# Patient Record
Sex: FEMALE | Race: WHITE | Employment: STUDENT | URBAN - METROPOLITAN AREA
[De-identification: names, ages, dates, MRNs, and addresses within clinical notes are randomized per-mention and may not be internally consistent; named-entity substitution may affect disease eponyms.]

---

## 2017-03-13 ENCOUNTER — HOSPITAL ENCOUNTER (OUTPATIENT)
Age: 22
Discharge: HOME | End: 2017-03-13
Attending: EMERGENCY MEDICINE
Payer: COMMERCIAL

## 2017-03-13 VITALS
TEMPERATURE: 98 F | WEIGHT: 145 LBS | DIASTOLIC BLOOD PRESSURE: 70 MMHG | SYSTOLIC BLOOD PRESSURE: 117 MMHG | OXYGEN SATURATION: 97 % | RESPIRATION RATE: 18 BRPM | HEART RATE: 81 BPM

## 2017-03-13 DIAGNOSIS — J02.9 ACUTE VIRAL PHARYNGITIS: ICD-10-CM

## 2017-03-13 DIAGNOSIS — J06.9 VIRAL UPPER RESPIRATORY INFECTION: Primary | ICD-10-CM

## 2017-03-13 LAB — S PYO AG THROAT QL: NEGATIVE

## 2017-03-13 PROCEDURE — 99212 OFFICE O/P EST SF 10 MIN: CPT

## 2017-03-13 PROCEDURE — 87430 STREP A AG IA: CPT

## 2017-03-13 PROCEDURE — 99213 OFFICE O/P EST LOW 20 MIN: CPT

## 2017-03-13 RX ORDER — FLUTICASONE PROPIONATE 50 MCG
1-2 SPRAY, SUSPENSION (ML) NASAL DAILY
Qty: 16 G | Refills: 0 | Status: SHIPPED | OUTPATIENT
Start: 2017-03-13 | End: 2017-04-12

## 2017-03-13 RX ORDER — DEXTROAMPHETAMINE SACCHARATE, AMPHETAMINE ASPARTATE, DEXTROAMPHETAMINE SULFATE AND AMPHETAMINE SULFATE 3.75; 3.75; 3.75; 3.75 MG/1; MG/1; MG/1; MG/1
TABLET ORAL 2 TIMES DAILY
COMMUNITY
End: 2018-02-06

## 2017-03-14 NOTE — ED PROVIDER NOTES
Patient Seen in: [facility] AND CLINICS Immediate Care In [address] [place] [address]    History   Patient presents with:  Sore Throat    Stated Complaint: sore throat     HPI  2 days ago started with a sore throat, sneezing, nasal congestion and feeling tired.   Patient is a fe Tympanic membrane and external ear normal.   Left Ear: Tympanic membrane and external ear normal.   Nose: Rhinorrhea present. Mouth/Throat: Uvula is midline and mucous membranes are normal. Posterior oropharyngeal erythema present.  No oropharyngeal exuda

## 2017-08-27 NOTE — ED INITIAL ASSESSMENT (HPI)
Patient c/o feeling anxious X2 weeks and becoming worse tonight with crying and sleeping more today.

## 2017-08-27 NOTE — ED NOTES
Pt sent from immediate care \"to talk to a Dr about her anxiety\". Pt does not appear anxious at this time, appears calm and has support person at the bedside.  Pt states she has been having increasing anxiety over the last couple weeks with difficulty slee

## 2017-08-27 NOTE — ED PROVIDER NOTES
Patient Seen in: [facility]    History   Patient presents with: Anxiety/Panic attack (neurologic)      HPI    Patient presents complaining of intermittent anxiety for the past 2 weeks. Worse today.   She states that she has h and vital signs reviewed. All other systems reviewed and negative except as noted above. PSFH elements reviewed from today and agreed except as otherwise stated in HPI.     Physical Exam     ED Triage Vitals [08/26/17 2146]  BP: 130/54  Pulse: 70  R presents with anxiety symptoms as well as depression. No self-harm thoughts or suicidal ideation. Patient in no distress in the ED, no physical complaints.   We will give the patient short prescription for Ativan for anxiety control, patient will follow-u

## 2017-09-07 ENCOUNTER — OFFICE VISIT (OUTPATIENT)
Dept: FAMILY MEDICINE CLINIC | Facility: CLINIC | Age: 22
End: 2017-09-07

## 2017-09-07 DIAGNOSIS — Z11.1 ENCOUNTER FOR TUBERCULIN SKIN TEST: Primary | ICD-10-CM

## 2017-09-07 PROCEDURE — 86580 TB INTRADERMAL TEST: CPT | Performed by: NURSE PRACTITIONER

## 2017-09-07 NOTE — PATIENT INSTRUCTIONS
You will need to return to clinic in 48-72 hours to have results of TB test read. Please return to clinic on on Sun 9/10/17 before 4:30pm to have your TB test read. If you do not return during this time your test will need to be repeated.      Our cl control program. Testing for TB is often part of routine prenatal care.   What other tests might I have along with this test?  If you test positive on a TB skin test, your healthcare provider will probably order a chest X-ray, sputum smear (a test on mucus may be slightly sore. You cannot get TB from the skin test.  What might affect my test results?   If you have been vaccinated against tuberculosis with BCG (Bacilli Calmette-Margarita), you can have a positive reaction to the skin test even if you don't have,

## 2017-09-07 NOTE — PROGRESS NOTES
Erendira Arredondo is a 25year old female who presents for TB testing. TUBERCULOSIS SCREENING QUESTIONNAIRE    · Live vaccines in the past month? no  · Any steroid medication in the past month? no  · History of BCG vaccine?    no  · If female, are yo

## 2017-09-10 ENCOUNTER — OFFICE VISIT (OUTPATIENT)
Dept: FAMILY MEDICINE CLINIC | Facility: CLINIC | Age: 22
End: 2017-09-10

## 2017-09-10 DIAGNOSIS — Z11.1 ENCOUNTER FOR PPD SKIN TEST READING: Primary | ICD-10-CM

## 2017-09-10 LAB — INDURATION (): 0 MM (ref 0–11)

## 2017-09-10 NOTE — PROGRESS NOTES
Patient here for TB skin test read. The results were negtaive, 0 mm, no induration. Date given: 9/7/17  Date read: 9/10/17  Time Read: 9:27 AM    Patient instructed to return as needed.

## 2017-11-22 ENCOUNTER — TELEPHONE (OUTPATIENT)
Dept: INTERNAL MEDICINE CLINIC | Facility: CLINIC | Age: 22
End: 2017-11-22

## 2017-11-22 PROBLEM — F41.9 ANXIETY: Status: ACTIVE | Noted: 2017-11-22

## 2017-11-22 PROBLEM — F90.1 ATTENTION DEFICIT HYPERACTIVITY DISORDER (ADHD), PREDOMINANTLY HYPERACTIVE TYPE: Status: ACTIVE | Noted: 2017-11-22

## 2017-11-23 NOTE — PROGRESS NOTES
HPI:    Patient ID: Stacey Matos is a 23year old female.     HPI she is here today to establish care  With new physician   she states that she has anxiety and adhd, she is on medication for 4 years and she was following with her pediatrician , and sh for Anxiety. Disp:  Rfl:    amphetamine-dextroamphetamine (ADDERALL) 15 MG Oral Tab Take by mouth 2 (two) times daily. Disp:  Rfl:    Norethindrone Acet-Ethinyl Est (MICROGESTIN 1/20 OR) Take by mouth.  Disp:  Rfl:      Allergies:No Known Allergies    HISTO rub.    No murmur heard. Pulmonary/Chest: Effort normal and breath sounds normal. No accessory muscle usage. No respiratory distress. She has no wheezes. She has no rales. She exhibits no tenderness. Abdominal: Soft.  Bowel sounds are normal. She exhibit

## 2017-11-23 NOTE — PATIENT INSTRUCTIONS
outine physical examination  (primary encounter diagnosis) request  Records  from previous PCP  Adhd/ anxiety- she is on medication for 4 years - will refer to [facility] , she will need psych  eval ,discussed with her in detail , if any suicidal thought ,

## 2017-12-06 ENCOUNTER — TELEPHONE (OUTPATIENT)
Dept: INTERNAL MEDICINE CLINIC | Facility: CLINIC | Age: 22
End: 2017-12-06

## 2017-12-06 NOTE — TELEPHONE ENCOUNTER
Pharmacy called asking if we had given pt a prescription for adderall, it seems the pt is telling the pharmacy that Dr. Eliecer Ash was going to Escribe it and that hard copies were no longer needed, please advise.

## 2017-12-06 NOTE — TELEPHONE ENCOUNTER
I did not , I discussed with her that she needs to see psych first  we can adjust meds , patient needs to call us

## 2017-12-06 NOTE — TELEPHONE ENCOUNTER
Spoke with pt and she states that she did call on 11/28 to schedule appt with psych and they took all her information and were supposed to call her back but she hasn't herd from them. Pt was advised to call them back and see what is the hold up.  Pt states

## 2017-12-07 NOTE — TELEPHONE ENCOUNTER
Checked with pt and the refill that she is looking for is Lorazepam. Per pharmacy, last refilled by Dr. Yu Ernst on 11/15/17 #30, 10/12/17 #30 and 9/10/17 #30.

## 2017-12-12 ENCOUNTER — TELEPHONE (OUTPATIENT)
Dept: INTERNAL MEDICINE CLINIC | Facility: CLINIC | Age: 22
End: 2017-12-12

## 2017-12-12 NOTE — TELEPHONE ENCOUNTER
Spoke to her , she states that her insurance is not covering to see psych thru [facility], they gave her different psyc, she was asling if is ok to see different one , I explained that if he is in her network is ok

## 2017-12-12 NOTE — TELEPHONE ENCOUNTER
I did talk to her , but she states her insurance doesn't cover her to see psych thru [facility] , they gave her different psychiatrist?

## 2018-01-22 ENCOUNTER — TELEPHONE (OUTPATIENT)
Dept: INTERNAL MEDICINE CLINIC | Facility: CLINIC | Age: 23
End: 2018-01-22

## 2018-01-22 NOTE — TELEPHONE ENCOUNTER
Patient calling states she called her physiatrist and will send her medication as he feels she needs.  HERB

## 2018-01-29 ENCOUNTER — TELEPHONE (OUTPATIENT)
Dept: INTERNAL MEDICINE CLINIC | Facility: CLINIC | Age: 23
End: 2018-01-29

## 2018-01-29 NOTE — TELEPHONE ENCOUNTER
Patient called, she has been waiting for a medication to treat her anxiety, her psychiatrist was to release information along with names of medications to you. Please call patient back and let her know.        Patient called back, she spoke to pyschiatrist

## 2018-01-30 NOTE — TELEPHONE ENCOUNTER
Patient called to follow up documents from psych. Spoke with Jose Moore from Dr. Irvin Gutierrez office [phone]. She will fax paper work to [place] office. Need to call back to confirm document has been received.

## 2018-01-31 NOTE — TELEPHONE ENCOUNTER
Follow up call placed to Dr. Caridad Bravo office regarding documents from psych. Informed office we have not received their fax but Dr. Adeel Vuong will speak directly to Dr. Ulysses Virk when she comes in. Per [facility] ok to do.     Call placed to patient, did not ans

## 2018-01-31 NOTE — TELEPHONE ENCOUNTER
Tried to call Dr. Marietta Silva office,  is seeing patients but gave direct extension to [place] office to ask what medication was recommended for patient during office visit.  Spoke with Juan José Swanson, requested for last progress notes faxed to [place] o

## 2018-02-01 NOTE — TELEPHONE ENCOUNTER
Spoke to he rpsychiatrist  Yesterday , he was recommending  congnitive behavior therpay , he thinks that is anxiety not  adhd, ihe thinks that we can consider strattera and not adderall, no lorazepam. Spoke with patient and explain she will call psych off

## 2018-02-05 ENCOUNTER — TELEPHONE (OUTPATIENT)
Dept: INTERNAL MEDICINE CLINIC | Facility: CLINIC | Age: 23
End: 2018-02-05

## 2018-02-05 NOTE — TELEPHONE ENCOUNTER
Patient calling to let you know her psychiatrist wants her to continue taking ADHD and anxiety medication wanting you to prescribe the medications if any questions please call Dr Joe Somers [phone]

## 2018-02-06 NOTE — TELEPHONE ENCOUNTER
Patient called back, informed script will be at [facility] office tomorrow. She is also aware she is to have random drug screening, patient verbalized understanding. Informed celexa was sent to pharmacy. Patient denies any questions.

## 2018-02-06 NOTE — TELEPHONE ENCOUNTER
Spoke with dr Silvia Mcfarlane about her medication , per him ok to continue with adderal  but she ill needs random drug screen , and also to start her on citalopram once a day , if not doing ok will need follow up

## 2018-04-14 ENCOUNTER — TELEPHONE (OUTPATIENT)
Dept: INTERNAL MEDICINE CLINIC | Facility: CLINIC | Age: 23
End: 2018-04-14

## 2018-04-16 RX ORDER — NORETHINDRONE ACETATE AND ETHINYL ESTRADIOL 1; .02 MG/1; MG/1
1 TABLET ORAL DAILY
Qty: 30 TABLET | Refills: 0 | Status: SHIPPED | OUTPATIENT
Start: 2018-04-16 | End: 2018-05-21

## 2018-04-30 RX ORDER — CITALOPRAM 10 MG/1
TABLET ORAL
Qty: 30 TABLET | Refills: 0 | Status: SHIPPED | OUTPATIENT
Start: 2018-04-30 | End: 2019-04-17

## 2018-05-21 NOTE — PROGRESS NOTES
HPI:    Patient ID: Mariama Diana is a 23year old female. HPI   Anxiety - she feels much better  Since she is on citalopram , she is not taking lorazepam anymore . She is not having panic attack as she used to have before. She is following with psyc Prescriptions:  Norethindrone Acet-Ethinyl Est (MICROGESTIN 1/20) 1-20 MG-MCG Oral Tab Take 1 tablet by mouth daily.  Disp: 1 Package Rfl: 3   CITALOPRAM HYDROBROMIDE 10 MG Oral Tab TAKE ONE TABLET BY MOUTH ONE TIME DAILY  Disp: 30 tablet Rfl: 0   amphetami eye exhibits no discharge. Left eye exhibits no discharge. No scleral icterus. Neck: Normal range of motion. Neck supple. No JVD present. No tracheal tenderness present. No tracheal deviation present. No thyroid mass and no thyromegaly present.    Cardiov Prescriptions Disp Refills    Norethindrone Acet-Ethinyl Est (MICROGESTIN 1/20) 1-20 MG-MCG Oral Tab 1 Package 3      Sig: Take 1 tablet by mouth daily.            Imaging & Referrals:  None        TI#[id number]

## 2018-05-21 NOTE — PATIENT INSTRUCTIONS
Anxiety  (primary encounter diagnosis)-stable advised to continue with current medication.     Attention deficit hyperactivity disorder (adhd), predominantly hyperactive type-continue with Adderall she is following with psychiatry    Irregular menstrual cyc

## 2018-07-23 ENCOUNTER — TELEPHONE (OUTPATIENT)
Dept: INTERNAL MEDICINE CLINIC | Facility: CLINIC | Age: 23
End: 2018-07-23

## 2018-07-23 NOTE — TELEPHONE ENCOUNTER
Has question about this medication     Current Outpatient Prescriptions:    Norethindrone Acet-Ethinyl Est (MICROGESTIN 1/20) 1-20 MG-MCG Oral Tab, Take 1 tablet by mouth daily. , Disp: 1 Package, Rfl: 3

## 2018-07-24 NOTE — TELEPHONE ENCOUNTER
Any type of physical or emotional stress can change cycles. Infection and heat dehydration exercise. Sometimes exercise can delay. And even stop it for a while.   Would monitor for the next cycle might might be a little bit delayed and then she go back o

## 2018-07-24 NOTE — TELEPHONE ENCOUNTER
Pt states she has been on this birth control for three years and a new pack she has opened and during the third week, pt got her period, pt wants to know if exercise--or new med bupropion-or having medication in extreme heat could cause cycle to come early

## 2018-08-15 ENCOUNTER — TELEPHONE (OUTPATIENT)
Dept: INTERNAL MEDICINE CLINIC | Facility: CLINIC | Age: 23
End: 2018-08-15

## 2018-08-15 NOTE — TELEPHONE ENCOUNTER
Current Outpatient Prescriptions:     Please call patient will  [address]     •  amphetamine-dextroamphetamine (ADDERALL) 15 MG Oral Tab, Take 1 tablet (15 mg total) by mouth 2 (two) times daily. , Disp: 60 tablet, Rfl: 0

## 2018-08-22 RX ORDER — NORETHINDRONE ACETATE AND ETHINYL ESTRADIOL 1; .02 MG/1; MG/1
1 TABLET ORAL DAILY
Qty: 1 PACKAGE | Refills: 3 | Status: SHIPPED | OUTPATIENT
Start: 2018-08-22 | End: 2018-11-20

## 2018-08-22 NOTE — TELEPHONE ENCOUNTER
Dr. Thomas Hoang, Norethindrone Acet-Ethinyl FAILED Gynecology protocol. UNABLE to fill please review. Thank you.     Gynecology Medications  Protocol Criteria:  · Appointment scheduled in the past 12 months or the next 3 months  · Pap smear in the past 12

## 2018-10-01 ENCOUNTER — HOSPITAL ENCOUNTER (EMERGENCY)
Facility: HOSPITAL | Age: 23
Discharge: HOME | End: 2018-10-01
Attending: EMERGENCY MEDICINE
Payer: COMMERCIAL

## 2018-10-01 VITALS
WEIGHT: 170 LBS | RESPIRATION RATE: 20 BRPM | SYSTOLIC BLOOD PRESSURE: 138 MMHG | HEART RATE: 98 BPM | OXYGEN SATURATION: 98 % | DIASTOLIC BLOOD PRESSURE: 62 MMHG | BODY MASS INDEX: 27 KG/M2 | TEMPERATURE: 98 F

## 2018-10-01 DIAGNOSIS — B34.9 VIRAL SYNDROME: Primary | ICD-10-CM

## 2018-10-01 PROCEDURE — 99283 EMERGENCY DEPT VISIT LOW MDM: CPT

## 2018-10-01 PROCEDURE — 87430 STREP A AG IA: CPT

## 2018-10-01 RX ORDER — CODEINE PHOSPHATE AND GUAIFENESIN 10; 100 MG/5ML; MG/5ML
5 SOLUTION ORAL EVERY 6 HOURS PRN
Qty: 118 ML | Refills: 0 | Status: SHIPPED | OUTPATIENT
Start: 2018-10-01 | End: 2018-11-15

## 2018-10-01 RX ORDER — BENZONATATE 100 MG/1
100 CAPSULE ORAL 3 TIMES DAILY PRN
Qty: 30 CAPSULE | Refills: 0 | Status: SHIPPED | OUTPATIENT
Start: 2018-10-01 | End: 2018-10-31

## 2018-10-01 NOTE — ED INITIAL ASSESSMENT (HPI)
Pt states she woke up with sore throat, coughing, and LADY. Pt states that the back of her throat is all white. Pt has non productive cough in triage and tachypnea.

## 2018-10-01 NOTE — ED NOTES
Pt alert and interactive. Complains of sore throat and SOB that started yesterday. Dry cough present. Also noted she had a fever yesterday and took aleve. Family members have been sick as well. Denies cp, dizziness, lightheadedness.  Family present at bedsi

## 2018-10-01 NOTE — ED PROVIDER NOTES
Patient Seen in: [facility] AND [facility] Emergency Department    History   No chief complaint on file. Stated Complaint: Cough; Sore Throat    HPI    28-year-old female with history of ADHD here with complaints of cough, sore throat, difficulty breathing clyde Temp src Oral   SpO2 98 %   O2 Device None (Room air)       Current:/62   Pulse 98   Temp 97.5 °F (36.4 °C) (Oral)   Resp 20   Wt 77.1 kg   SpO2 98%   BMI 27.44 kg/m²         Physical Exam   Constitutional: She is oriented to person, place, and clyde interpreted all the ED vitals  - afebrile, hemodynamically stable  - I ordered and personally reviewed the labs and imaging and found negative rapid strep  - supportive care discussed        Medical Record Review: I personally reviewed available prior medi mouth every 6 (six) hours as needed for cough. , Print Script, Disp-118 mL, R-0

## 2018-11-13 ENCOUNTER — HOSPITAL ENCOUNTER (OUTPATIENT)
Age: 23
Discharge: HOME | End: 2018-11-13
Attending: FAMILY MEDICINE
Payer: COMMERCIAL

## 2018-11-13 VITALS
RESPIRATION RATE: 20 BRPM | HEIGHT: 65 IN | HEART RATE: 94 BPM | WEIGHT: 170 LBS | OXYGEN SATURATION: 100 % | DIASTOLIC BLOOD PRESSURE: 66 MMHG | BODY MASS INDEX: 28.32 KG/M2 | TEMPERATURE: 99 F | SYSTOLIC BLOOD PRESSURE: 122 MMHG

## 2018-11-13 DIAGNOSIS — S61.211A LACERATION OF LEFT INDEX FINGER WITHOUT FOREIGN BODY WITHOUT DAMAGE TO NAIL, INITIAL ENCOUNTER: Primary | ICD-10-CM

## 2018-11-13 PROCEDURE — 90471 IMMUNIZATION ADMIN: CPT

## 2018-11-13 PROCEDURE — 99214 OFFICE O/P EST MOD 30 MIN: CPT

## 2018-11-13 PROCEDURE — 99213 OFFICE O/P EST LOW 20 MIN: CPT

## 2018-11-13 RX ORDER — CEPHALEXIN 500 MG/1
500 CAPSULE ORAL 2 TIMES DAILY
Qty: 10 CAPSULE | Refills: 0 | Status: SHIPPED | OUTPATIENT
Start: 2018-11-13 | End: 2018-11-18

## 2018-11-13 NOTE — ED NOTES
Pt discharged to care of self. Pt assessed by MD. All orders completed and acknowledged. Pt new medication and after care discussed, all questions anserwred. Pt confirmed understanding.

## 2018-11-13 NOTE — ED PROVIDER NOTES
Patient Seen in: [address]    History   Patient presents with:  Laceration Abrasion (integumentary)    Stated Complaint: LACERATION INDEX FINGER    HPI  27-year-old female presents to [address] [place] [address] with a laceration on the left in distal tip of index finger. Hemostatic. No foreign bodies or deep structures. ). She exhibits normal range of motion, no tenderness, no bony tenderness, normal capillary refill, no deformity and no swelling. Normal sensation noted. Normal strength noted.

## 2018-11-13 NOTE — ED INITIAL ASSESSMENT (HPI)
Pt states cutting finger on with exacto knife about 1 hour ago. Pt does not know if she had received tetanus shot in the last 5 years.

## 2018-11-15 ENCOUNTER — OFFICE VISIT (OUTPATIENT)
Dept: INTERNAL MEDICINE CLINIC | Facility: CLINIC | Age: 23
End: 2018-11-15
Payer: COMMERCIAL

## 2018-11-15 VITALS
SYSTOLIC BLOOD PRESSURE: 112 MMHG | BODY MASS INDEX: 29.84 KG/M2 | OXYGEN SATURATION: 98 % | HEIGHT: 64 IN | HEART RATE: 76 BPM | TEMPERATURE: 99 F | WEIGHT: 174.81 LBS | DIASTOLIC BLOOD PRESSURE: 56 MMHG

## 2018-11-15 DIAGNOSIS — S61.211A LACERATION OF LEFT INDEX FINGER WITHOUT FOREIGN BODY WITHOUT DAMAGE TO NAIL, INITIAL ENCOUNTER: Primary | ICD-10-CM

## 2018-11-15 DIAGNOSIS — F90.9 ATTENTION DEFICIT HYPERACTIVITY DISORDER (ADHD), UNSPECIFIED ADHD TYPE: ICD-10-CM

## 2018-11-15 DIAGNOSIS — Z28.21 REFUSED INFLUENZA VACCINE: ICD-10-CM

## 2018-11-15 PROCEDURE — 99213 OFFICE O/P EST LOW 20 MIN: CPT | Performed by: INTERNAL MEDICINE

## 2018-11-15 RX ORDER — BUPROPION HYDROCHLORIDE 100 MG/1
100 TABLET, EXTENDED RELEASE ORAL 2 TIMES DAILY
COMMUNITY
End: 2018-11-15

## 2018-11-15 RX ORDER — DEXTROAMPHETAMINE SACCHARATE, AMPHETAMINE ASPARTATE, DEXTROAMPHETAMINE SULFATE AND AMPHETAMINE SULFATE 3.75; 3.75; 3.75; 3.75 MG/1; MG/1; MG/1; MG/1
15 TABLET ORAL 2 TIMES DAILY
Qty: 60 TABLET | Refills: 0 | Status: SHIPPED | OUTPATIENT
Start: 2018-11-15 | End: 2019-07-01

## 2018-11-15 RX ORDER — BUPROPION HYDROCHLORIDE 200 MG/1
200 TABLET, EXTENDED RELEASE ORAL DAILY
COMMUNITY

## 2018-11-16 NOTE — PROGRESS NOTES
HPI:    Patient ID: Miguel Medina is a 26year old female. HPI    Patient comes in today for follow-up after she was seen at urgent care for cutting her index finger with with a knife.   She had a tetanus shot and was started on Keflex and told to f Cardiovascular: Normal rate and regular rhythm. Pulmonary/Chest: Effort normal and breath sounds normal.   Abdominal: Soft. Bowel sounds are normal.   Musculoskeletal: Normal range of motion.    Left index finger wound , no infection noted    Neurologic

## 2018-11-16 NOTE — PATIENT INSTRUCTIONS
ASSESSMENT/PLAN:   Laceration of left index finger without foreign body without damage to nail, initial encounter  (primary encounter diagnosis) no infection continue with antibiotic until done  Attention deficit hyperactivity disorder (adhd), unspecified

## 2018-11-20 ENCOUNTER — TELEPHONE (OUTPATIENT)
Dept: INTERNAL MEDICINE CLINIC | Facility: CLINIC | Age: 23
End: 2018-11-20

## 2018-11-20 RX ORDER — NORETHINDRONE ACETATE AND ETHINYL ESTRADIOL 1; .02 MG/1; MG/1
1 TABLET ORAL DAILY
Qty: 1 PACKAGE | Refills: 2 | Status: SHIPPED | OUTPATIENT
Start: 2018-11-20 | End: 2019-03-02

## 2018-11-20 NOTE — TELEPHONE ENCOUNTER
Current Outpatient Medications:   •  Norethindrone Acet-Ethinyl Est (MICROGESTIN 1/20) 1-20 MG-MCG Oral Tab, Take 1 tablet by mouth daily. , Disp: 1 Package, Rfl: 3

## 2018-11-21 NOTE — TELEPHONE ENCOUNTER
Spoke with patient she is still taking birth control   Refill sent to [address] [place] [address]. Per Dr. Yanet lyons

## 2019-03-04 RX ORDER — ESTAZOLAM 2 MG/1
TABLET ORAL
Qty: 21 TABLET | Refills: 1 | Status: SHIPPED | OUTPATIENT
Start: 2019-03-04 | End: 2019-05-01

## 2019-04-17 ENCOUNTER — HOSPITAL ENCOUNTER (OUTPATIENT)
Age: 24
Discharge: HOME | End: 2019-04-17
Attending: EMERGENCY MEDICINE
Payer: COMMERCIAL

## 2019-04-17 VITALS
TEMPERATURE: 98 F | BODY MASS INDEX: 26.66 KG/M2 | SYSTOLIC BLOOD PRESSURE: 120 MMHG | OXYGEN SATURATION: 99 % | HEIGHT: 65 IN | WEIGHT: 160 LBS | RESPIRATION RATE: 18 BRPM | HEART RATE: 85 BPM | DIASTOLIC BLOOD PRESSURE: 73 MMHG

## 2019-04-17 DIAGNOSIS — H81.10 BENIGN PAROXYSMAL POSITIONAL VERTIGO, UNSPECIFIED LATERALITY: Primary | ICD-10-CM

## 2019-04-17 PROCEDURE — 81025 URINE PREGNANCY TEST: CPT

## 2019-04-17 PROCEDURE — 81003 URINALYSIS AUTO W/O SCOPE: CPT

## 2019-04-17 PROCEDURE — 99214 OFFICE O/P EST MOD 30 MIN: CPT

## 2019-04-17 PROCEDURE — 99213 OFFICE O/P EST LOW 20 MIN: CPT

## 2019-04-17 RX ORDER — MECLIZINE HCL 12.5 MG/1
25 TABLET ORAL ONCE
Status: DISCONTINUED | OUTPATIENT
Start: 2019-04-17 | End: 2019-04-18

## 2019-04-17 RX ORDER — ONDANSETRON 4 MG/1
4 TABLET, ORALLY DISINTEGRATING ORAL EVERY 4 HOURS PRN
Qty: 10 TABLET | Refills: 0 | Status: SHIPPED | OUTPATIENT
Start: 2019-04-17 | End: 2019-04-24

## 2019-04-17 RX ORDER — ONDANSETRON 4 MG/1
4 TABLET, ORALLY DISINTEGRATING ORAL ONCE
Status: COMPLETED | OUTPATIENT
Start: 2019-04-17 | End: 2019-04-17

## 2019-04-17 RX ORDER — MECLIZINE HYDROCHLORIDE 25 MG/1
25 TABLET ORAL 3 TIMES DAILY PRN
Qty: 21 TABLET | Refills: 0 | Status: SHIPPED | OUTPATIENT
Start: 2019-04-17 | End: 2020-08-12

## 2019-04-18 NOTE — ED PROVIDER NOTES
Patient Seen in: [facility] CLIN[facility] Immediate Care In [address] [place] [address]    History   Patient presents with:  Dizziness (neurologic)    Stated Complaint: dizzy, nausea    HPI    Patient complains of dizziness that started 2 hours ago.  Patient states it feels like t 120/73   Pulse 85   Temp 98.4 °F (36.9 °C) (Oral)   Resp 18   Ht 165.1 cm (5' 5\")   Wt 72.6 kg   LMP 03/17/2019   SpO2 99%   BMI 26.63 kg/m²    PULSE OX   GENERAL: In mild distress  HEAD: normocephalic, atraumatic,   EYES: PERRLA, EOMI, conj sclera clear, times daily as needed.   Qty: 21 tablet Refills: 0                    Disposition and Plan     Clinical Impression:  Benign paroxysmal positional vertigo, unspecified laterality  (primary encounter diagnosis)    Disposition:  Discharge    Follow-up:  Evelyn Cook,

## 2019-05-01 RX ORDER — ESTAZOLAM 2 MG/1
TABLET ORAL
Qty: 63 TABLET | Refills: 1 | Status: SHIPPED | OUTPATIENT
Start: 2019-05-01 | End: 2019-10-12

## 2019-05-15 ENCOUNTER — TELEPHONE (OUTPATIENT)
Dept: INTERNAL MEDICINE CLINIC | Facility: CLINIC | Age: 24
End: 2019-05-15

## 2019-05-15 NOTE — TELEPHONE ENCOUNTER
She asked for the letter that states what is her diagnosis and waht treatment she is on . She needs that for her school.  She gives permission to put the diagnosis

## 2019-05-15 NOTE — TELEPHONE ENCOUNTER
Patient is in the office was told to come in at 6pm letter would be ready that she has ADD must be on letter head

## 2019-05-15 NOTE — TELEPHONE ENCOUNTER
Patient is requesting to speak to you she needs a  letter for her school on your letter head that you are the prescriber  of her medication and that she takes it for AD

## 2019-07-01 ENCOUNTER — TELEPHONE (OUTPATIENT)
Dept: INTERNAL MEDICINE CLINIC | Facility: CLINIC | Age: 24
End: 2019-07-01

## 2019-07-01 NOTE — TELEPHONE ENCOUNTER
Current Outpatient Medications:     •  amphetamine-dextroamphetamine (ADDERALL) 15 MG Oral Tab, Take 1 tablet (15 mg total) by mouth 2 (two) times daily. , Disp: 60 tablet, Rfl: 0 REFILL     Patient needs refill, please call patient when ready, she will p

## 2019-09-18 ENCOUNTER — TELEPHONE (OUTPATIENT)
Dept: INTERNAL MEDICINE CLINIC | Facility: CLINIC | Age: 24
End: 2019-09-18

## 2019-10-12 NOTE — TELEPHONE ENCOUNTER
NEEDS REFILL     Current Outpatient Medications:   •  •  MICROGESTIN 1/20 1-20 MG-MCG Oral Tab, TAKE ONE TABLET BY MOUTH ONE TIME DAILY, Disp: 63 tablet, Rfl: 1  •

## 2019-10-13 RX ORDER — NORETHINDRONE ACETATE AND ETHINYL ESTRADIOL 1; .02 MG/1; MG/1
1 TABLET ORAL
Qty: 28 TABLET | Refills: 0 | Status: SHIPPED | OUTPATIENT
Start: 2019-10-13 | End: 2019-11-23

## 2019-10-13 NOTE — TELEPHONE ENCOUNTER
Please review; protocol failed.     No pap on file  LOV-11/15/2018      Requested Prescriptions   Pending Prescriptions Disp Refills   • Norethindrone Acet-Ethinyl Est (MICROGESTIN 1/20) 1-20 MG-MCG Oral Tab 63 tablet 1     Sig: Take 1 tablet by mouth once

## 2019-11-08 RX ORDER — ESTAZOLAM 2 MG/1
TABLET ORAL
Qty: 21 TABLET | Refills: 0 | OUTPATIENT
Start: 2019-11-08

## 2019-11-09 ENCOUNTER — TELEPHONE (OUTPATIENT)
Dept: INTERNAL MEDICINE CLINIC | Facility: CLINIC | Age: 24
End: 2019-11-09

## 2019-11-09 NOTE — TELEPHONE ENCOUNTER
Stabbing pain L thigh. Now, in 24 hrs. To calf. Throbbing. On BCP. No travel Hx. No trauma. No CP and No SOB. Spoke with Dr. Jessica Chance in [address] [place] [address]. Message to PCP.

## 2019-11-13 ENCOUNTER — OFFICE VISIT (OUTPATIENT)
Dept: INTERNAL MEDICINE CLINIC | Facility: CLINIC | Age: 24
End: 2019-11-13
Payer: COMMERCIAL

## 2019-11-13 VITALS
TEMPERATURE: 98 F | SYSTOLIC BLOOD PRESSURE: 110 MMHG | DIASTOLIC BLOOD PRESSURE: 69 MMHG | BODY MASS INDEX: 26.66 KG/M2 | HEIGHT: 65 IN | HEART RATE: 88 BPM | WEIGHT: 160 LBS | RESPIRATION RATE: 18 BRPM

## 2019-11-13 DIAGNOSIS — Z01.419 ENCOUNTER FOR GYNECOLOGICAL EXAMINATION: Primary | ICD-10-CM

## 2019-11-13 PROCEDURE — 99213 OFFICE O/P EST LOW 20 MIN: CPT | Performed by: INTERNAL MEDICINE

## 2019-11-13 NOTE — PROGRESS NOTES
HPI:    Patient ID: Kait Javed is a 27year old female.     HPI She is here today for follow up   she is following with psychiatry  And she is currently on adderal and bupropion and is stable      She is refusing flu shot    She never had pap smear daily. 28 tablet 0   • amphetamine-dextroamphetamine (ADDERALL) 15 MG Oral Tab Take 1 tablet (15 mg total) by mouth 2 (two) times daily. 60 tablet 0   • Meclizine HCl 25 MG Oral Tab Take 1 tablet (25 mg total) by mouth 3 (three) times daily as needed.  21 t Normal rate, regular rhythm, normal heart sounds and intact distal pulses. Exam reveals no gallop and no friction rub. No murmur heard. Pulmonary/Chest: Effort normal and breath sounds normal. No accessory muscle usage. No respiratory distress.  She has

## 2019-11-25 RX ORDER — ESTAZOLAM 2 MG/1
TABLET ORAL
Qty: 21 TABLET | Refills: 0 | Status: SHIPPED | OUTPATIENT
Start: 2019-11-25 | End: 2019-11-27

## 2019-11-25 RX ORDER — ESTAZOLAM 2 MG/1
TABLET ORAL
Qty: 21 TABLET | Refills: 2 | OUTPATIENT
Start: 2019-11-25

## 2019-11-25 NOTE — TELEPHONE ENCOUNTER
Please review; protocol failed.     Please also advise if add'l refills appropriate    11/13/19 Dr. Nam Carry office visit notes     HPI:    Patient ID: Trever Elias is a 27year old female.     HPI She is here today for follow up   she is followi

## 2019-11-27 ENCOUNTER — TELEPHONE (OUTPATIENT)
Dept: INTERNAL MEDICINE CLINIC | Facility: CLINIC | Age: 24
End: 2019-11-27

## 2019-11-27 NOTE — TELEPHONE ENCOUNTER
Current Outpatient Medications:   •    MICROGESTIN 1/20 1-20 MG-MCG Oral Tab, Take 1 tablet by mouth once daily. , Disp: 21 tablet, Rfl: 0 REFILL      Please add refills to this rx, patient does not want to keep calling every month for this medication to

## 2019-11-27 NOTE — TELEPHONE ENCOUNTER
Current Outpatient Medications:       •  amphetamine-dextroamphetamine (ADDERALL) 15 MG Oral Tab, Take 1 tablet (15 mg total) by mouth 2 (two) times daily. , Disp: 60 tablet, Rfl: 0 REFILL       Please call patient when rx is ready to , she would l

## 2019-11-27 NOTE — TELEPHONE ENCOUNTER
I sent electronically to her pharmacy but please check with the patient if she got through her pharmacy otherwise she will need to come to the office to  prescription

## 2019-11-28 NOTE — TELEPHONE ENCOUNTER
Please see patient's message below and advise    Only one month Rx sent 11/25/19    Carolyn Lopez    11/27/19 11:56 AM   Note         Current Outpatient Medications:   •    MICROGESTIN 1/20 1-20 MG-MCG Oral Tab, Take 1 tablet by mouth once daily. , Disp: 2

## 2019-11-29 RX ORDER — NORETHINDRONE ACETATE AND ETHINYL ESTRADIOL 1; .02 MG/1; MG/1
1 TABLET ORAL
Qty: 63 TABLET | Refills: 0 | Status: SHIPPED | OUTPATIENT
Start: 2019-11-29 | End: 2020-02-07

## 2019-11-29 RX ORDER — ESTAZOLAM 2 MG/1
TABLET ORAL
Qty: 21 TABLET | Refills: 0 | OUTPATIENT
Start: 2019-11-29

## 2020-01-29 ENCOUNTER — HOSPITAL ENCOUNTER (OUTPATIENT)
Age: 25
Discharge: HOME | End: 2020-01-29
Attending: EMERGENCY MEDICINE
Payer: COMMERCIAL

## 2020-01-29 VITALS
WEIGHT: 165 LBS | HEART RATE: 92 BPM | RESPIRATION RATE: 19 BRPM | TEMPERATURE: 98 F | OXYGEN SATURATION: 98 % | SYSTOLIC BLOOD PRESSURE: 129 MMHG | BODY MASS INDEX: 28.17 KG/M2 | DIASTOLIC BLOOD PRESSURE: 65 MMHG | HEIGHT: 64 IN

## 2020-01-29 DIAGNOSIS — J01.90 ACUTE SINUSITIS, RECURRENCE NOT SPECIFIED, UNSPECIFIED LOCATION: Primary | ICD-10-CM

## 2020-01-29 PROCEDURE — 99213 OFFICE O/P EST LOW 20 MIN: CPT

## 2020-01-29 PROCEDURE — 99214 OFFICE O/P EST MOD 30 MIN: CPT

## 2020-01-29 RX ORDER — AMOXICILLIN 875 MG/1
875 TABLET, COATED ORAL 2 TIMES DAILY
Qty: 20 TABLET | Refills: 0 | Status: SHIPPED | OUTPATIENT
Start: 2020-01-29 | End: 2020-02-08

## 2020-01-30 NOTE — ED PROVIDER NOTES
Patient Seen in: [facility] CLINICS Immediate Care In [address] [place] [address]      History   Patient presents with:  Sore Throat    Stated Complaint: cough, fever, throat swelling    HPI    Patient is a 51-year-old female who is been ill for more than a week with URI sy and breath sounds normal. No respiratory distress. Abdominal: Soft. Bowel sounds are normal. Exhibits no distension and no mass. There is no tenderness. There is no rebound and no guarding. Musculoskeletal: Normal range of motion.  Exhibits no edema or

## 2020-02-07 RX ORDER — ESTAZOLAM 2 MG/1
TABLET ORAL
Qty: 63 TABLET | Refills: 1 | Status: SHIPPED | OUTPATIENT
Start: 2020-02-07 | End: 2020-07-02

## 2020-02-07 NOTE — TELEPHONE ENCOUNTER
Please review; protocol failed. Requested Prescriptions     Pending Prescriptions Disp Refills   • MICROGESTIN 1/20 1-20 MG-MCG Oral Tab [Pharmacy Med Name: Microgestin 1/20 1-20 Mg-Mcg Tab Mayn] 63 tablet 0     Sig: Take 1 tablet by mouth once daily.

## 2020-03-16 RX ORDER — DEXTROAMPHETAMINE SACCHARATE, AMPHETAMINE ASPARTATE, DEXTROAMPHETAMINE SULFATE AND AMPHETAMINE SULFATE 3.75; 3.75; 3.75; 3.75 MG/1; MG/1; MG/1; MG/1
15 TABLET ORAL 2 TIMES DAILY
Qty: 60 TABLET | Refills: 0 | Status: SHIPPED | OUTPATIENT
Start: 2020-03-16 | End: 2020-07-29

## 2020-03-16 NOTE — TELEPHONE ENCOUNTER
Patient called in requesting refill on medication below. She states that she has a few days of medication left. She states that she normally has to  the script.  She states that if it can be sent electronically that she confirmed the [facility] in [place]

## 2020-03-20 ENCOUNTER — NURSE TRIAGE (OUTPATIENT)
Dept: INTERNAL MEDICINE CLINIC | Facility: CLINIC | Age: 25
End: 2020-03-20

## 2020-03-20 ENCOUNTER — TELEPHONE (OUTPATIENT)
Dept: INTERNAL MEDICINE CLINIC | Facility: CLINIC | Age: 25
End: 2020-03-20

## 2020-03-20 NOTE — TELEPHONE ENCOUNTER
Action Requested: Summary for Provider     []  Critical Lab, Recommendations Needed  [x] Need Additional Advice  []   FYI    []   Need Orders  [] Need Medications Sent to Pharmacy  []  Other     SUMMARY: Pt with calf right pain, denies swelling, hot to dottie

## 2020-03-21 NOTE — TELEPHONE ENCOUNTER
I did talk to her she states that she feel like pulled muscle, spasm ,  no redness, no swelling .  She feels ok, advised her if any swelling , pain , redness go to er

## 2020-06-25 ENCOUNTER — TELEPHONE (OUTPATIENT)
Dept: INTERNAL MEDICINE CLINIC | Facility: CLINIC | Age: 25
End: 2020-06-25

## 2020-06-25 NOTE — TELEPHONE ENCOUNTER
Message # [id number] 2020 07:58p [[facility]]  To:  From: CANELO Monique MD:  Phone#:  ----------------------------------------------------------------------  Garcia Shankar [phone] RE; PT RASH ON INSIDE OF THIGH,   [birth year]  Paged at number : PA

## 2020-06-25 NOTE — TELEPHONE ENCOUNTER
She called me last night she did not notice a rash on her thigh.   I did advise her to put some topical corticosteroid cream if not better to follow-up

## 2020-07-02 RX ORDER — NORETHINDRONE ACETATE AND ETHINYL ESTRADIOL 1; .02 MG/1; MG/1
1 TABLET ORAL DAILY
Qty: 63 TABLET | Refills: 1 | Status: SHIPPED | OUTPATIENT
Start: 2020-07-02 | End: 2020-11-04

## 2020-07-02 NOTE — TELEPHONE ENCOUNTER
Patient called, requesting a refill for the following medication.     MICROGESTIN 1/20 1-20 MG-MCG Oral Tab

## 2020-07-02 NOTE — TELEPHONE ENCOUNTER
This is being sent to you without review by the Triage staff due to the high call volumes created by the COVID-19 virus, per the email sent by Dr. Kilo Brenner on 3/19/20. Thank you for your support.     Centralized Nurse Triage Team

## 2020-07-03 NOTE — TELEPHONE ENCOUNTER
Spoke with patient (name and  verified). Patient informed of message below. Patient states she had an appointment a few months ago. States she feels she doesn't have to come in for another  appointment.   Also stated she normally gets a 6 month refill

## 2020-07-29 RX ORDER — DEXTROAMPHETAMINE SACCHARATE, AMPHETAMINE ASPARTATE, DEXTROAMPHETAMINE SULFATE AND AMPHETAMINE SULFATE 3.75; 3.75; 3.75; 3.75 MG/1; MG/1; MG/1; MG/1
15 TABLET ORAL 2 TIMES DAILY
Qty: 60 TABLET | Refills: 0 | Status: SHIPPED | OUTPATIENT
Start: 2020-07-29 | End: 2020-10-13

## 2020-08-12 ENCOUNTER — OFFICE VISIT (OUTPATIENT)
Dept: INTERNAL MEDICINE CLINIC | Facility: CLINIC | Age: 25
End: 2020-08-12
Payer: COMMERCIAL

## 2020-08-12 VITALS
WEIGHT: 177 LBS | HEART RATE: 76 BPM | HEIGHT: 64.5 IN | BODY MASS INDEX: 29.85 KG/M2 | OXYGEN SATURATION: 98 % | SYSTOLIC BLOOD PRESSURE: 114 MMHG | DIASTOLIC BLOOD PRESSURE: 66 MMHG | TEMPERATURE: 98 F

## 2020-08-12 DIAGNOSIS — S41.152S DOG BITE OF ARM, LEFT, SEQUELA: Primary | ICD-10-CM
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
## 2020-08-12 PROCEDURE — 99213 OFFICE O/P EST LOW 20 MIN: CPT | Performed by: INTERNAL MEDICINE

## 2020-08-12 PROCEDURE — 3074F SYST BP LT 130 MM HG: CPT | Performed by: INTERNAL MEDICINE

## 2020-08-12 PROCEDURE — 3008F BODY MASS INDEX DOCD: CPT | Performed by: INTERNAL MEDICINE

## 2020-08-12 PROCEDURE — 3078F DIAST BP <80 MM HG: CPT | Performed by: INTERNAL MEDICINE

## 2020-08-12 RX ORDER — HYDROCODONE BITARTRATE AND ACETAMINOPHEN 5; 325 MG/1; MG/1
TABLET ORAL
COMMUNITY
Start: 2020-08-10 | End: 2020-08-28

## 2020-08-12 RX ORDER — BUPROPION HYDROCHLORIDE 300 MG/1
TABLET ORAL
COMMUNITY
Start: 2020-04-10 | End: 2020-08-12

## 2020-08-12 RX ORDER — AMOXICILLIN AND CLAVULANATE POTASSIUM 875; 125 MG/1; MG/1
TABLET, FILM COATED ORAL
COMMUNITY
Start: 2020-08-10 | End: 2020-08-28
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
## 2020-08-20 ENCOUNTER — OFFICE VISIT (OUTPATIENT)
Dept: INTERNAL MEDICINE CLINIC | Facility: CLINIC | Age: 25
End: 2020-08-20
Payer: COMMERCIAL

## 2020-08-20 VITALS
BODY MASS INDEX: 30 KG/M2 | TEMPERATURE: 100 F | SYSTOLIC BLOOD PRESSURE: 118 MMHG | OXYGEN SATURATION: 98 % | HEART RATE: 76 BPM | DIASTOLIC BLOOD PRESSURE: 67 MMHG | WEIGHT: 178 LBS

## 2020-08-20 DIAGNOSIS — S51.859S: Primary | ICD-10-CM
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
## 2020-08-20 PROCEDURE — 3078F DIAST BP <80 MM HG: CPT | Performed by: INTERNAL MEDICINE

## 2020-08-20 PROCEDURE — 3074F SYST BP LT 130 MM HG: CPT | Performed by: INTERNAL MEDICINE

## 2020-08-20 PROCEDURE — 99213 OFFICE O/P EST LOW 20 MIN: CPT | Performed by: INTERNAL MEDICINE

## 2020-08-20 NOTE — PROGRESS NOTES
HPI:    Patient ID: Mary Shipley is a 27year old female. HPI    she is  here today for follow-up on her stitches removal.  She states that her is better. She does have some tenderness on palpation but wound is healing nicely.     Review of S tablet 1   • BuPROPion HCl ER, SR, 200 MG Oral Tablet 12 Hr Take 200 mg by mouth daily.        • Amoxicillin-Pot Clavulanate 875-125 MG Oral Tab      • HYDROcodone-acetaminophen 5-325 MG Oral Tab        Allergies:No Known Allergies    HISTORY:  Past Medical normal and breath sounds normal. No accessory muscle usage. No respiratory distress. She has no wheezes. She has no rales. She exhibits no tenderness. Abdominal: Soft. Bowel sounds are normal. She exhibits no shifting dullness, no distension and no mass.

## 2020-08-28 ENCOUNTER — OFFICE VISIT (OUTPATIENT)
Dept: INTERNAL MEDICINE CLINIC | Facility: CLINIC | Age: 25
End: 2020-08-28
Payer: COMMERCIAL

## 2020-08-28 ENCOUNTER — MED REC SCAN ONLY (OUTPATIENT)
Dept: INTERNAL MEDICINE CLINIC | Facility: CLINIC | Age: 25
End: 2020-08-28

## 2020-08-28 VITALS
DIASTOLIC BLOOD PRESSURE: 64 MMHG | HEIGHT: 65 IN | OXYGEN SATURATION: 98 % | HEART RATE: 92 BPM | TEMPERATURE: 99 F | SYSTOLIC BLOOD PRESSURE: 105 MMHG | BODY MASS INDEX: 28.82 KG/M2 | WEIGHT: 173 LBS

## 2020-08-28 DIAGNOSIS — Z00.00 PHYSICAL EXAM, ANNUAL: Primary | ICD-10-CM

## 2020-08-28 PROCEDURE — 3074F SYST BP LT 130 MM HG: CPT | Performed by: INTERNAL MEDICINE

## 2020-08-28 PROCEDURE — 3078F DIAST BP <80 MM HG: CPT | Performed by: INTERNAL MEDICINE

## 2020-08-28 PROCEDURE — 99395 PREV VISIT EST AGE 18-39: CPT | Performed by: INTERNAL MEDICINE

## 2020-08-28 PROCEDURE — 3008F BODY MASS INDEX DOCD: CPT | Performed by: INTERNAL MEDICINE

## 2020-08-28 NOTE — PROGRESS NOTES
HPI:   Luis M Luis is a 27year old female who presents for a complete physical exam.   Her period is every other month.  She is using birth control    Wt Readings from Last 3 Encounters:  08/28/20 : 173 lb (78.5 kg)  08/20/20 : 178 lb (80.7 kg) Integumentary Negative Breast discharge, breast lump(s), hair loss and rash. MS Negative Back pain and joint pain. Hema/Lymph Negative Easy bleeding, easy bruising and thromboembolic events.    Allergic/Immuno Negative Environmental allergies, food al smear - refusing today   Add- she is following with psychiatry  Pt' s weight is Body mass index is 28.79 kg/m². , recommended low fat diet and aerobic exercise 30 minutes three times weekly.   The patient indicates understanding of these issues and agrees to

## 2020-10-13 ENCOUNTER — TELEPHONE (OUTPATIENT)
Dept: INTERNAL MEDICINE CLINIC | Facility: CLINIC | Age: 25
End: 2020-10-13

## 2020-10-13 NOTE — TELEPHONE ENCOUNTER
Patient needs a refill on      amphetamine-dextroamphetamine (ADDERALL) 15 MG Oral Tab 60 tablet 0 7/29/2020    Sig:   Take 1 tablet (15 mg total) by mouth 2 (two) times daily. Route:   Oral           Thank you.

## 2020-11-04 RX ORDER — ESTAZOLAM 2 MG/1
TABLET ORAL
Qty: 63 TABLET | Refills: 0 | Status: SHIPPED | OUTPATIENT
Start: 2020-11-04 | End: 2021-03-09

## 2020-11-17 RX ORDER — DEXTROAMPHETAMINE SACCHARATE, AMPHETAMINE ASPARTATE, DEXTROAMPHETAMINE SULFATE AND AMPHETAMINE SULFATE 3.75; 3.75; 3.75; 3.75 MG/1; MG/1; MG/1; MG/1
15 TABLET ORAL 2 TIMES DAILY
Qty: 60 TABLET | Refills: 0 | OUTPATIENT
Start: 2020-11-17 | End: 2020-12-17

## 2020-11-17 RX ORDER — DEXTROAMPHETAMINE SACCHARATE, AMPHETAMINE ASPARTATE, DEXTROAMPHETAMINE SULFATE AND AMPHETAMINE SULFATE 3.75; 3.75; 3.75; 3.75 MG/1; MG/1; MG/1; MG/1
15 TABLET ORAL 2 TIMES DAILY
Qty: 60 TABLET | Refills: 0 | OUTPATIENT
Start: 2020-12-18 | End: 2021-01-17

## 2020-11-17 RX ORDER — DEXTROAMPHETAMINE SACCHARATE, AMPHETAMINE ASPARTATE, DEXTROAMPHETAMINE SULFATE AND AMPHETAMINE SULFATE 3.75; 3.75; 3.75; 3.75 MG/1; MG/1; MG/1; MG/1
15 TABLET ORAL 2 TIMES DAILY
Qty: 60 TABLET | Refills: 0 | OUTPATIENT
Start: 2021-01-18 | End: 2021-02-17

## 2020-11-17 NOTE — TELEPHONE ENCOUNTER
Pt would like a refill on Rx amphetamine-dextroamphetamine 15MG twice daily. Pt requests 3 month supply. Pt will be out of medication today.   Please advise         Current Outpatient Medications     Dispense Refill      63 tablet 0   • amphetamine-dextroam

## 2020-12-22 RX ORDER — DEXTROAMPHETAMINE SACCHARATE, AMPHETAMINE ASPARTATE, DEXTROAMPHETAMINE SULFATE AND AMPHETAMINE SULFATE 3.75; 3.75; 3.75; 3.75 MG/1; MG/1; MG/1; MG/1
15 TABLET ORAL 2 TIMES DAILY
Qty: 60 TABLET | Refills: 0 | OUTPATIENT
Start: 2021-01-22 | End: 2021-02-21

## 2020-12-22 RX ORDER — DEXTROAMPHETAMINE SACCHARATE, AMPHETAMINE ASPARTATE, DEXTROAMPHETAMINE SULFATE AND AMPHETAMINE SULFATE 3.75; 3.75; 3.75; 3.75 MG/1; MG/1; MG/1; MG/1
15 TABLET ORAL 2 TIMES DAILY
Qty: 60 TABLET | Refills: 0 | Status: SHIPPED | OUTPATIENT
Start: 2020-12-22 | End: 2021-01-21

## 2020-12-22 RX ORDER — DEXTROAMPHETAMINE SACCHARATE, AMPHETAMINE ASPARTATE, DEXTROAMPHETAMINE SULFATE AND AMPHETAMINE SULFATE 3.75; 3.75; 3.75; 3.75 MG/1; MG/1; MG/1; MG/1
15 TABLET ORAL 2 TIMES DAILY
Qty: 60 TABLET | Refills: 0 | OUTPATIENT
Start: 2021-02-22 | End: 2021-03-24

## 2020-12-22 NOTE — TELEPHONE ENCOUNTER
Patient is requesting refill of medication amphetamine-dextroamphetamine (ADDERALL) 15 MG Oral Tab. Patient is requesting a 3 month supply of this medication.

## 2021-01-29 NOTE — TELEPHONE ENCOUNTER
Pt would like a refill on her adderall medication. Pt states that she was told by the pharmacy to contact us directly. Pt would like to know if the doctor can give her refills.   Ph[facility]acy: [facility] [place] (listed)     Current Outpatient Medications   M

## 2021-01-30 RX ORDER — DEXTROAMPHETAMINE SACCHARATE, AMPHETAMINE ASPARTATE, DEXTROAMPHETAMINE SULFATE AND AMPHETAMINE SULFATE 3.75; 3.75; 3.75; 3.75 MG/1; MG/1; MG/1; MG/1
15 TABLET ORAL 2 TIMES DAILY
Qty: 60 TABLET | Refills: 0 | Status: SHIPPED | OUTPATIENT
Start: 2021-01-30 | End: 2021-03-03

## 2021-03-03 RX ORDER — DEXTROAMPHETAMINE SACCHARATE, AMPHETAMINE ASPARTATE, DEXTROAMPHETAMINE SULFATE AND AMPHETAMINE SULFATE 3.75; 3.75; 3.75; 3.75 MG/1; MG/1; MG/1; MG/1
15 TABLET ORAL 2 TIMES DAILY
Qty: 60 TABLET | Refills: 0 | Status: SHIPPED | OUTPATIENT
Start: 2021-03-03 | End: 2021-04-06

## 2021-03-09 RX ORDER — ESTAZOLAM 2 MG/1
TABLET ORAL
Qty: 63 TABLET | Refills: 0 | Status: SHIPPED | OUTPATIENT
Start: 2021-03-09 | End: 2021-05-03

## 2021-04-06 RX ORDER — DEXTROAMPHETAMINE SACCHARATE, AMPHETAMINE ASPARTATE, DEXTROAMPHETAMINE SULFATE AND AMPHETAMINE SULFATE 3.75; 3.75; 3.75; 3.75 MG/1; MG/1; MG/1; MG/1
15 TABLET ORAL 2 TIMES DAILY
Qty: 60 TABLET | Refills: 0 | Status: SHIPPED | OUTPATIENT
Start: 2021-04-06 | End: 2021-05-12

## 2021-05-03 RX ORDER — ESTAZOLAM 2 MG/1
TABLET ORAL
Qty: 63 TABLET | Refills: 0 | Status: SHIPPED | OUTPATIENT
Start: 2021-05-03 | End: 2021-05-19

## 2021-05-10 NOTE — TELEPHONE ENCOUNTER
Patient requesting refill on medication     amphetamine-dextroamphetamine (ADDERALL) 15 MG Oral Tab 60 tablet 0 4/6/2021

## 2021-05-11 RX ORDER — DEXTROAMPHETAMINE SACCHARATE, AMPHETAMINE ASPARTATE, DEXTROAMPHETAMINE SULFATE AND AMPHETAMINE SULFATE 3.75; 3.75; 3.75; 3.75 MG/1; MG/1; MG/1; MG/1
15 TABLET ORAL 2 TIMES DAILY
Qty: 60 TABLET | Refills: 0 | OUTPATIENT
Start: 2021-05-11

## 2021-05-12 RX ORDER — DEXTROAMPHETAMINE SACCHARATE, AMPHETAMINE ASPARTATE, DEXTROAMPHETAMINE SULFATE AND AMPHETAMINE SULFATE 3.75; 3.75; 3.75; 3.75 MG/1; MG/1; MG/1; MG/1
15 TABLET ORAL 2 TIMES DAILY
Qty: 60 TABLET | Refills: 0 | Status: SHIPPED | OUTPATIENT
Start: 2021-05-12 | End: 2021-05-19

## 2021-05-19 NOTE — PROGRESS NOTES
HPI/Subjective:     Patient ID: Jonny Rodrigues is a 23year old female. She is here today for follow up and refill on her medication    She is following with psychiatry . She needs refill on her adderall.   She is doing okay    She also states t agitation, behavioral problems, confusion, hallucinations and sleep disturbance. The patient is not nervous/anxious.       Current Outpatient Medications   Medication Sig Dispense Refill   • Norethindrone Acet-Ethinyl Est (MICROGESTIN 1/20) 1-20 MG-MCG Oral Trachea: No tracheal tenderness or tracheal deviation. Cardiovascular:      Rate and Rhythm: Normal rate and regular rhythm. Heart sounds: Normal heart sounds. No murmur heard. No friction rub. No gallop.     Pulmonary:      Effort: Pulmonary effor tablet 0     Sig: Take 1 tablet (15 mg total) by mouth 2 (two) times daily.        Imaging & Referrals:  [facility] - INTERNAL

## 2021-07-05 RX ORDER — DEXTROAMPHETAMINE SACCHARATE, AMPHETAMINE ASPARTATE, DEXTROAMPHETAMINE SULFATE AND AMPHETAMINE SULFATE 3.75; 3.75; 3.75; 3.75 MG/1; MG/1; MG/1; MG/1
TABLET ORAL
Qty: 60 TABLET | Refills: 0 | Status: SHIPPED | OUTPATIENT
Start: 2021-07-05 | End: 2021-08-31

## 2021-08-27 RX ORDER — DEXTROAMPHETAMINE SACCHARATE, AMPHETAMINE ASPARTATE, DEXTROAMPHETAMINE SULFATE AND AMPHETAMINE SULFATE 3.75; 3.75; 3.75; 3.75 MG/1; MG/1; MG/1; MG/1
15 TABLET ORAL DAILY
Qty: 30 TABLET | Refills: 0 | Status: SHIPPED | OUTPATIENT
Start: 2021-08-27 | End: 2021-08-30

## 2021-08-27 RX ORDER — DEXTROAMPHETAMINE SACCHARATE, AMPHETAMINE ASPARTATE, DEXTROAMPHETAMINE SULFATE AND AMPHETAMINE SULFATE 3.75; 3.75; 3.75; 3.75 MG/1; MG/1; MG/1; MG/1
15 TABLET ORAL DAILY
Qty: 30 TABLET | Refills: 0 | OUTPATIENT
Start: 2021-08-27 | End: 2021-09-26

## 2021-08-27 RX ORDER — DEXTROAMPHETAMINE SACCHARATE, AMPHETAMINE ASPARTATE, DEXTROAMPHETAMINE SULFATE AND AMPHETAMINE SULFATE 3.75; 3.75; 3.75; 3.75 MG/1; MG/1; MG/1; MG/1
15 TABLET ORAL DAILY
Qty: 30 TABLET | Refills: 0 | OUTPATIENT
Start: 2021-09-27 | End: 2021-10-27

## 2021-08-27 RX ORDER — DEXTROAMPHETAMINE SACCHARATE, AMPHETAMINE ASPARTATE, DEXTROAMPHETAMINE SULFATE AND AMPHETAMINE SULFATE 3.75; 3.75; 3.75; 3.75 MG/1; MG/1; MG/1; MG/1
15 TABLET ORAL DAILY
Qty: 30 TABLET | Refills: 0 | OUTPATIENT
Start: 2021-10-28 | End: 2021-11-27

## 2021-08-27 NOTE — TELEPHONE ENCOUNTER
Patient contacted [facility] and was told to contact our office for rx aterall. Patient also asking if she can be approved for 3 month supply, please call at [phone],[id number].   *request to send as high priority only has 2 days left

## 2021-08-28 RX ORDER — NORETHINDRONE ACETATE/ETHINYL ESTRADIOL 1MG-20MCG
KIT ORAL
Qty: 63 TABLET | Refills: 0 | Status: SHIPPED | OUTPATIENT
Start: 2021-08-28 | End: 2021-11-05

## 2021-08-30 ENCOUNTER — TELEPHONE (OUTPATIENT)
Dept: INTERNAL MEDICINE CLINIC | Facility: CLINIC | Age: 26
End: 2021-08-30

## 2021-08-30 RX ORDER — DEXTROAMPHETAMINE SACCHARATE, AMPHETAMINE ASPARTATE, DEXTROAMPHETAMINE SULFATE AND AMPHETAMINE SULFATE 3.75; 3.75; 3.75; 3.75 MG/1; MG/1; MG/1; MG/1
15 TABLET ORAL DAILY
Qty: 30 TABLET | Refills: 0 | Status: SHIPPED | OUTPATIENT
Start: 2021-08-30 | End: 2021-08-31 | Stop reason: CLARIF

## 2021-08-30 NOTE — TELEPHONE ENCOUNTER
Lottie/ Pharmacy Technician of [address] [place] [address] is calling to follow up to confirm directions for patient's medication amphetamine-dextroamphetamine (ADDERALL) 15 MG Oral Tab. Please advise.

## 2021-08-30 NOTE — TELEPHONE ENCOUNTER
Script sent in for only taking once daily; When last prescribed it was for 2 times daily. Left message for patient to call back with clarification.

## 2021-08-31 RX ORDER — DEXTROAMPHETAMINE SACCHARATE, AMPHETAMINE ASPARTATE, DEXTROAMPHETAMINE SULFATE AND AMPHETAMINE SULFATE 3.75; 3.75; 3.75; 3.75 MG/1; MG/1; MG/1; MG/1
15 TABLET ORAL 2 TIMES DAILY
Qty: 60 TABLET | Refills: 0 | OUTPATIENT
Start: 2021-08-31 | End: 2021-09-30

## 2021-08-31 RX ORDER — DEXTROAMPHETAMINE SACCHARATE, AMPHETAMINE ASPARTATE, DEXTROAMPHETAMINE SULFATE AND AMPHETAMINE SULFATE 3.75; 3.75; 3.75; 3.75 MG/1; MG/1; MG/1; MG/1
15 TABLET ORAL 2 TIMES DAILY
Qty: 60 TABLET | Refills: 0 | OUTPATIENT
Start: 2021-11-01 | End: 2021-12-01

## 2021-08-31 RX ORDER — DEXTROAMPHETAMINE SACCHARATE, AMPHETAMINE ASPARTATE, DEXTROAMPHETAMINE SULFATE AND AMPHETAMINE SULFATE 3.75; 3.75; 3.75; 3.75 MG/1; MG/1; MG/1; MG/1
15 TABLET ORAL 2 TIMES DAILY
Qty: 60 TABLET | Refills: 0 | OUTPATIENT
Start: 2021-10-01 | End: 2021-10-31

## 2021-08-31 NOTE — TELEPHONE ENCOUNTER
The patient calling to stated the Adderall 15 mg is to be for BID. It was sent in for only once day.     I pended the 90 day panel for BID

## 2021-10-01 RX ORDER — DEXTROAMPHETAMINE SACCHARATE, AMPHETAMINE ASPARTATE, DEXTROAMPHETAMINE SULFATE AND AMPHETAMINE SULFATE 3.75; 3.75; 3.75; 3.75 MG/1; MG/1; MG/1; MG/1
1 TABLET ORAL 2 TIMES DAILY
Qty: 60 TABLET | Refills: 0 | Status: SHIPPED | OUTPATIENT
Start: 2021-10-01 | End: 2021-11-01

## 2021-10-01 NOTE — TELEPHONE ENCOUNTER
amphetamine-dextroamphetamine 15 MG Oral Tab    Pt requesting refill has one day left  Please advise

## 2021-11-01 RX ORDER — DEXTROAMPHETAMINE SACCHARATE, AMPHETAMINE ASPARTATE, DEXTROAMPHETAMINE SULFATE AND AMPHETAMINE SULFATE 3.75; 3.75; 3.75; 3.75 MG/1; MG/1; MG/1; MG/1
1 TABLET ORAL 2 TIMES DAILY
Qty: 60 TABLET | Refills: 0 | Status: SHIPPED | OUTPATIENT
Start: 2021-11-01 | End: 2021-12-01

## 2021-11-01 NOTE — TELEPHONE ENCOUNTER
Pt would like a refill on her amphetamine-dextroamphetamine medication. Pt states that she is out of medication. Pharmacy: [facility] (listed) pt would like to confirm that the medication will be sent for twice a day.      Current Outpatient Medic

## 2021-11-01 NOTE — TELEPHONE ENCOUNTER
Please review; protocol failed/no protocol    Requested Prescriptions   Pending Prescriptions Disp Refills    amphetamine-dextroamphetamine 15 MG Oral Tab 60 tablet 0     Sig: Take 1 tablet (15 mg total) by mouth 2 (two) times daily.         There is no ref

## 2021-11-05 RX ORDER — NORETHINDRONE ACETATE AND ETHINYL ESTRADIOL 1; .02 MG/1; MG/1
1 TABLET ORAL DAILY
Qty: 63 TABLET | Refills: 0 | Status: SHIPPED | OUTPATIENT
Start: 2021-11-05

## 2021-11-05 NOTE — TELEPHONE ENCOUNTER
Please review; protocol failed. No PAP smear on file.     Requested Prescriptions   Pending Prescriptions Disp Refills    LEOPOLDO 1/20 1-20 MG-MCG Oral Tab [Pharmacy Med Name: Omayravishal Del Cidens 1/20 1-20 Mg-Mcg Tab Nort] 63 tablet 0     Sig: TAKE 1 TABLET BY MOUTH RADHA

## 2021-12-01 RX ORDER — DEXTROAMPHETAMINE SACCHARATE, AMPHETAMINE ASPARTATE, DEXTROAMPHETAMINE SULFATE AND AMPHETAMINE SULFATE 3.75; 3.75; 3.75; 3.75 MG/1; MG/1; MG/1; MG/1
1 TABLET ORAL 2 TIMES DAILY
Qty: 60 TABLET | Refills: 0 | Status: SHIPPED | OUTPATIENT
Start: 2021-12-01 | End: 2021-12-29

## 2021-12-28 NOTE — TELEPHONE ENCOUNTER
Per patient she needs refill on her Adderral.    Current Outpatient Medications   Medication Sig Dispense Refill   • amphetamine-dextroamphetamine 15 MG Oral Tab Take 1 tablet (15 mg total) by mouth 2 (two) times daily.  60 tablet 0   •       •

## 2021-12-29 RX ORDER — DEXTROAMPHETAMINE SACCHARATE, AMPHETAMINE ASPARTATE, DEXTROAMPHETAMINE SULFATE AND AMPHETAMINE SULFATE 3.75; 3.75; 3.75; 3.75 MG/1; MG/1; MG/1; MG/1
1 TABLET ORAL 2 TIMES DAILY
Qty: 60 TABLET | Refills: 0 | Status: SHIPPED | OUTPATIENT
Start: 2021-12-29 | End: 2022-01-22

## 2022-01-21 NOTE — TELEPHONE ENCOUNTER
Patient is requesting refill for adderall and states she only has one or two days left of medication.

## 2022-01-22 RX ORDER — DEXTROAMPHETAMINE SACCHARATE, AMPHETAMINE ASPARTATE, DEXTROAMPHETAMINE SULFATE AND AMPHETAMINE SULFATE 3.75; 3.75; 3.75; 3.75 MG/1; MG/1; MG/1; MG/1
1 TABLET ORAL 2 TIMES DAILY
Qty: 60 TABLET | Refills: 0 | Status: SHIPPED | OUTPATIENT
Start: 2022-01-22

## 2022-02-21 ENCOUNTER — OFFICE VISIT (OUTPATIENT)
Dept: DERMATOLOGY CLINIC | Facility: CLINIC | Age: 27
End: 2022-02-21
Payer: COMMERCIAL

## 2022-02-21 DIAGNOSIS — L85.8 KERATOSIS PILARIS: Primary | ICD-10-CM

## 2022-02-21 PROCEDURE — 99203 OFFICE O/P NEW LOW 30 MIN: CPT | Performed by: PHYSICIAN ASSISTANT

## 2022-02-21 RX ORDER — CLINDAMYCIN PHOSPHATE 10 UG/ML
1 LOTION TOPICAL DAILY
Qty: 60 ML | Refills: 2 | Status: SHIPPED | OUTPATIENT
Start: 2022-02-21

## 2022-02-25 RX ORDER — DEXTROAMPHETAMINE SACCHARATE, AMPHETAMINE ASPARTATE, DEXTROAMPHETAMINE SULFATE AND AMPHETAMINE SULFATE 3.75; 3.75; 3.75; 3.75 MG/1; MG/1; MG/1; MG/1
1 TABLET ORAL 2 TIMES DAILY
Qty: 60 TABLET | Refills: 0 | Status: SHIPPED | OUTPATIENT
Start: 2022-02-25

## 2022-02-25 NOTE — TELEPHONE ENCOUNTER
Protocol failed or has No Protocol, please review  Requested Prescriptions   Pending Prescriptions Disp Refills    amphetamine-dextroamphetamine 15 MG Oral Tab 60 tablet 0     Sig: Take 1 tablet (15 mg total) by mouth 2 (two) times daily.         There is no refill protocol information for this order           Recent Outpatient Visits              4 days ago Keratosis pilaris    [facility] Ion Helton PA-C    Office Visit    9 months ago Attention deficit hyperactivity disorder (ADHD), other type    Mitch Bruno MD    Office Visit    1 year ago Physical exam, annual    Laura Betts MD    Office Visit    1 year ago Dog bite of forearm without complication, sequela    Luara Betts MD    Office Visit    1 year ago Dog bite of arm, left, sequela    Pedro Luis Ryder, Emerson Grier MD    Office Visit

## 2022-04-05 RX ORDER — DEXTROAMPHETAMINE SACCHARATE, AMPHETAMINE ASPARTATE, DEXTROAMPHETAMINE SULFATE AND AMPHETAMINE SULFATE 3.75; 3.75; 3.75; 3.75 MG/1; MG/1; MG/1; MG/1
15 TABLET ORAL 2 TIMES DAILY
Qty: 60 TABLET | Refills: 0 | Status: SHIPPED | OUTPATIENT
Start: 2022-04-05 | End: 2022-05-05

## 2022-04-05 RX ORDER — DEXTROAMPHETAMINE SACCHARATE, AMPHETAMINE ASPARTATE, DEXTROAMPHETAMINE SULFATE AND AMPHETAMINE SULFATE 3.75; 3.75; 3.75; 3.75 MG/1; MG/1; MG/1; MG/1
15 TABLET ORAL 2 TIMES DAILY
Qty: 60 TABLET | Refills: 0 | OUTPATIENT
Start: 2022-06-06 | End: 2022-07-06

## 2022-04-05 RX ORDER — DEXTROAMPHETAMINE SACCHARATE, AMPHETAMINE ASPARTATE, DEXTROAMPHETAMINE SULFATE AND AMPHETAMINE SULFATE 3.75; 3.75; 3.75; 3.75 MG/1; MG/1; MG/1; MG/1
15 TABLET ORAL 2 TIMES DAILY
Qty: 60 TABLET | Refills: 0 | OUTPATIENT
Start: 2022-05-06 | End: 2022-06-05

## 2022-04-05 NOTE — TELEPHONE ENCOUNTER
Please review refill failed/no protocol - Last office visit 5/19/21    Requested Prescriptions     Pending Prescriptions Disp Refills    amphetamine-dextroamphetamine (ADDERALL) 15 MG Oral Tab 60 tablet 0     Sig: Take 1 tablet (15 mg total) by mouth 2 (two) times daily. amphetamine-dextroamphetamine (ADDERALL) 15 MG Oral Tab 60 tablet 0     Sig: Take 1 tablet (15 mg total) by mouth 2 (two) times daily. amphetamine-dextroamphetamine (ADDERALL) 15 MG Oral Tab 60 tablet 0     Sig: Take 1 tablet (15 mg total) by mouth 2 (two) times daily. Recent Visits  Date Type Provider Dept   05/19/21 Office Visit Ankur Hansen MD [id number]   Showing recent visits within past 540 days with a meds authorizing provider and meeting all other requirements  Future Appointments  No visits were found meeting these conditions. Showing future appointments within next 150 days with a meds authorizing provider and meeting all other requirements    Requested Prescriptions   Pending Prescriptions Disp Refills    amphetamine-dextroamphetamine (ADDERALL) 15 MG Oral Tab 60 tablet 0     Sig: Take 1 tablet (15 mg total) by mouth 2 (two) times daily. There is no refill protocol information for this order        amphetamine-dextroamphetamine (ADDERALL) 15 MG Oral Tab 60 tablet 0     Sig: Take 1 tablet (15 mg total) by mouth 2 (two) times daily. There is no refill protocol information for this order        amphetamine-dextroamphetamine (ADDERALL) 15 MG Oral Tab 60 tablet 0     Sig: Take 1 tablet (15 mg total) by mouth 2 (two) times daily.         There is no refill protocol information for this order           Recent Outpatient Visits              1 month ago Keratosis pilaris    [facility] Grady Colon PA-C    Office Visit    10 months ago Attention deficit hyperactivity disorder (ADHD), other type    Marisol Carrillo MD    Office Visit    1 year ago Physical exam, annual    Diane Ken MD    Office Visit    1 year ago Dog bite of forearm without complication, sequela    Diane Ken MD    Office Visit    1 year ago Dog bite of arm, left, sequela    Regan Mir, Esteban Contreras MD    Office Visit

## 2022-04-05 NOTE — TELEPHONE ENCOUNTER
Please refill patient's Adderall 15mg two times daily, send to [address] [facility]

## 2022-06-01 ENCOUNTER — OFFICE VISIT (OUTPATIENT)
Dept: INTERNAL MEDICINE CLINIC | Facility: CLINIC | Age: 27
End: 2022-06-01
Payer: COMMERCIAL

## 2022-06-01 ENCOUNTER — MED REC SCAN ONLY (OUTPATIENT)
Dept: INTERNAL MEDICINE CLINIC | Facility: CLINIC | Age: 27
End: 2022-06-01

## 2022-06-01 VITALS
DIASTOLIC BLOOD PRESSURE: 72 MMHG | HEART RATE: 89 BPM | BODY MASS INDEX: 31.24 KG/M2 | WEIGHT: 183 LBS | HEIGHT: 64 IN | TEMPERATURE: 98 F | SYSTOLIC BLOOD PRESSURE: 110 MMHG | OXYGEN SATURATION: 99 %

## 2022-06-01 DIAGNOSIS — L85.8 KERATOSIS PILARIS: ICD-10-CM

## 2022-06-01 DIAGNOSIS — Z00.00 ANNUAL PHYSICAL EXAM: Primary | ICD-10-CM

## 2022-06-01 PROCEDURE — 99395 PREV VISIT EST AGE 18-39: CPT | Performed by: INTERNAL MEDICINE

## 2022-06-01 PROCEDURE — 3074F SYST BP LT 130 MM HG: CPT | Performed by: INTERNAL MEDICINE

## 2022-06-01 PROCEDURE — 3008F BODY MASS INDEX DOCD: CPT | Performed by: INTERNAL MEDICINE

## 2022-06-01 PROCEDURE — 3078F DIAST BP <80 MM HG: CPT | Performed by: INTERNAL MEDICINE

## 2022-07-07 RX ORDER — CLINDAMYCIN PHOSPHATE 10 UG/ML
1 LOTION TOPICAL DAILY
Qty: 60 ML | Refills: 0 | Status: SHIPPED | OUTPATIENT
Start: 2022-07-07

## 2022-09-29 ENCOUNTER — OFFICE VISIT (OUTPATIENT)
Dept: INTERNAL MEDICINE CLINIC | Facility: CLINIC | Age: 27
End: 2022-09-29

## 2022-09-29 VITALS
HEART RATE: 90 BPM | BODY MASS INDEX: 31.92 KG/M2 | SYSTOLIC BLOOD PRESSURE: 117 MMHG | HEIGHT: 64 IN | OXYGEN SATURATION: 99 % | WEIGHT: 187 LBS | TEMPERATURE: 99 F | DIASTOLIC BLOOD PRESSURE: 60 MMHG

## 2022-09-29 DIAGNOSIS — J02.0 STREP THROAT: Primary | ICD-10-CM

## 2022-09-29 PROCEDURE — 99213 OFFICE O/P EST LOW 20 MIN: CPT | Performed by: INTERNAL MEDICINE

## 2022-09-29 PROCEDURE — 3008F BODY MASS INDEX DOCD: CPT | Performed by: INTERNAL MEDICINE

## 2022-09-29 PROCEDURE — 3074F SYST BP LT 130 MM HG: CPT | Performed by: INTERNAL MEDICINE

## 2022-09-29 PROCEDURE — 3078F DIAST BP <80 MM HG: CPT | Performed by: INTERNAL MEDICINE

## 2022-09-29 RX ORDER — AMOXICILLIN AND CLAVULANATE POTASSIUM 875; 125 MG/1; MG/1
1 TABLET, FILM COATED ORAL 2 TIMES DAILY
Qty: 14 TABLET | Refills: 0 | Status: SHIPPED | OUTPATIENT
Start: 2022-09-29

## 2022-09-29 RX ORDER — AMOXICILLIN AND CLAVULANATE POTASSIUM 875; 125 MG/1; MG/1
1 TABLET, FILM COATED ORAL 2 TIMES DAILY
Qty: 14 TABLET | Refills: 0 | Status: SHIPPED | OUTPATIENT
Start: 2022-09-29 | End: 2022-09-29

## 2022-09-29 RX ORDER — AZITHROMYCIN 250 MG/1
250 TABLET, FILM COATED ORAL DAILY
COMMUNITY
Start: 2022-09-25 | End: 2022-10-01

## 2022-09-29 RX ORDER — MONTELUKAST SODIUM 10 MG/1
10 TABLET ORAL NIGHTLY
Qty: 20 TABLET | Refills: 0 | Status: SHIPPED | OUTPATIENT
Start: 2022-09-29

## 2022-09-29 NOTE — PATIENT INSTRUCTIONS
Strep throat /culture reviewed as well as ER note reviewed, I will start her on Augmentin, I did advise her to take with food, drink more fluids I did advise that to do steam can help with congestion, I will send also Singulair, if not better follow-up

## 2022-10-12 ENCOUNTER — TELEPHONE (OUTPATIENT)
Dept: INTERNAL MEDICINE CLINIC | Facility: CLINIC | Age: 27
End: 2022-10-12

## 2022-10-12 NOTE — TELEPHONE ENCOUNTER
Spoke to patient (name and  of patient verified). She states she was treated for strep throat and she is still having symptoms. She reports a productive cough that started 2-3 days ago with dark yellow/green mucus and states \"I am also starting to lose my voice. \" Patient states she has had strep throat in the past and it clears up within a few days of starting antibiotics. Patient states she scheduled her labs for this evening, but has been using cough drops throughout the day. Informed patient that she should reschedule lab draw and next time fast for 10-12 hours and only drink water, coffee, or black tea, patient verbalized understanding. Offered patient an office visit, patient declined for now and states she needs to check her insurance before scheduling the appointment and will call back. Instructed patient to take at-home Covid test since she may have a viral infection which is why the antibiotic is not alleviating symptoms, verbalized understanding.   Denies: fever, shortness of breath, and chest pain

## 2022-11-28 RX ORDER — CLINDAMYCIN PHOSPHATE 10 UG/ML
1 LOTION TOPICAL DAILY
Qty: 60 ML | Refills: 0 | Status: SHIPPED | OUTPATIENT
Start: 2022-11-28

## 2022-11-29 ENCOUNTER — APPOINTMENT (OUTPATIENT)
Dept: URBAN - METROPOLITAN AREA CLINIC 244 | Age: 27
Setting detail: DERMATOLOGY
End: 2022-11-29

## 2022-11-29 ENCOUNTER — LAB ENCOUNTER (OUTPATIENT)
Dept: LAB | Facility: HOSPITAL | Age: 27
End: 2022-11-29
Attending: INTERNAL MEDICINE
Payer: COMMERCIAL

## 2022-11-29 DIAGNOSIS — L738 OTHER SPECIFIED DISEASES OF HAIR AND HAIR FOLLICLES: ICD-10-CM

## 2022-11-29 DIAGNOSIS — Q826 OTHER SPECIFIED ANOMALIES OF SKIN: ICD-10-CM

## 2022-11-29 DIAGNOSIS — Q819 OTHER SPECIFIED ANOMALIES OF SKIN: ICD-10-CM

## 2022-11-29 DIAGNOSIS — Q828 OTHER SPECIFIED ANOMALIES OF SKIN: ICD-10-CM

## 2022-11-29 DIAGNOSIS — Z00.00 ANNUAL PHYSICAL EXAM: ICD-10-CM

## 2022-11-29 DIAGNOSIS — R21 RASH AND OTHER NONSPECIFIC SKIN ERUPTION: ICD-10-CM

## 2022-11-29 DIAGNOSIS — L663 OTHER SPECIFIED DISEASES OF HAIR AND HAIR FOLLICLES: ICD-10-CM

## 2022-11-29 PROBLEM — L85.8 OTHER SPECIFIED EPIDERMAL THICKENING: Status: ACTIVE | Noted: 2022-11-29

## 2022-11-29 PROBLEM — L02.423 FURUNCLE OF RIGHT UPPER LIMB: Status: ACTIVE | Noted: 2022-11-29

## 2022-11-29 PROBLEM — L02.424 FURUNCLE OF LEFT UPPER LIMB: Status: ACTIVE | Noted: 2022-11-29

## 2022-11-29 LAB
ALBUMIN SERPL-MCNC: 4 G/DL (ref 3.4–5)
ALBUMIN/GLOB SERPL: 1 {RATIO} (ref 1–2)
ALP LIVER SERPL-CCNC: 59 U/L
ALT SERPL-CCNC: 36 U/L
ANION GAP SERPL CALC-SCNC: 6 MMOL/L (ref 0–18)
AST SERPL-CCNC: 18 U/L (ref 15–37)
BASOPHILS # BLD AUTO: 0.03 X10(3) UL (ref 0–0.2)
BASOPHILS NFR BLD AUTO: 0.5 %
BILIRUB SERPL-MCNC: 0.5 MG/DL (ref 0.1–2)
BUN BLD-MCNC: 10 MG/DL (ref 7–18)
BUN/CREAT SERPL: 13.7 (ref 10–20)
CALCIUM BLD-MCNC: 9.1 MG/DL (ref 8.5–10.1)
CHLORIDE SERPL-SCNC: 106 MMOL/L (ref 98–112)
CHOLEST SERPL-MCNC: 152 MG/DL (ref ?–200)
CO2 SERPL-SCNC: 26 MMOL/L (ref 21–32)
CREAT BLD-MCNC: 0.73 MG/DL
DEPRECATED RDW RBC AUTO: 39.5 FL (ref 35.1–46.3)
EOSINOPHIL # BLD AUTO: 0.17 X10(3) UL (ref 0–0.7)
EOSINOPHIL NFR BLD AUTO: 2.8 %
ERYTHROCYTE [DISTWIDTH] IN BLOOD BY AUTOMATED COUNT: 11.8 % (ref 11–15)
FASTING PATIENT LIPID ANSWER: YES
FASTING STATUS PATIENT QL REPORTED: YES
GFR SERPLBLD BASED ON 1.73 SQ M-ARVRAT: 117 ML/MIN/1.73M2 (ref 60–?)
GLOBULIN PLAS-MCNC: 3.9 G/DL (ref 2.8–4.4)
GLUCOSE BLD-MCNC: 90 MG/DL (ref 70–99)
HCT VFR BLD AUTO: 39.9 %
HDLC SERPL-MCNC: 42 MG/DL (ref 40–59)
HGB BLD-MCNC: 13.2 G/DL
IMM GRANULOCYTES # BLD AUTO: 0.01 X10(3) UL (ref 0–1)
IMM GRANULOCYTES NFR BLD: 0.2 %
LDLC SERPL CALC-MCNC: 82 MG/DL (ref ?–100)
LYMPHOCYTES # BLD AUTO: 2.2 X10(3) UL (ref 1–4)
LYMPHOCYTES NFR BLD AUTO: 35.9 %
MCH RBC QN AUTO: 30.1 PG (ref 26–34)
MCHC RBC AUTO-ENTMCNC: 33.1 G/DL (ref 31–37)
MCV RBC AUTO: 90.9 FL
MONOCYTES # BLD AUTO: 0.43 X10(3) UL (ref 0.1–1)
MONOCYTES NFR BLD AUTO: 7 %
NEUTROPHILS # BLD AUTO: 3.28 X10 (3) UL (ref 1.5–7.7)
NEUTROPHILS # BLD AUTO: 3.28 X10(3) UL (ref 1.5–7.7)
NEUTROPHILS NFR BLD AUTO: 53.6 %
NONHDLC SERPL-MCNC: 110 MG/DL (ref ?–130)
OSMOLALITY SERPL CALC.SUM OF ELEC: 285 MOSM/KG (ref 275–295)
PLATELET # BLD AUTO: 333 10(3)UL (ref 150–450)
POTASSIUM SERPL-SCNC: 3.9 MMOL/L (ref 3.5–5.1)
PROT SERPL-MCNC: 7.9 G/DL (ref 6.4–8.2)
RBC # BLD AUTO: 4.39 X10(6)UL
SODIUM SERPL-SCNC: 138 MMOL/L (ref 136–145)
TRIGL SERPL-MCNC: 162 MG/DL (ref 30–149)
TSI SER-ACNC: 2.18 MIU/ML (ref 0.36–3.74)
VLDLC SERPL CALC-MCNC: 26 MG/DL (ref 0–30)
WBC # BLD AUTO: 6.1 X10(3) UL (ref 4–11)

## 2022-11-29 PROCEDURE — 84443 ASSAY THYROID STIM HORMONE: CPT

## 2022-11-29 PROCEDURE — 80053 COMPREHEN METABOLIC PANEL: CPT

## 2022-11-29 PROCEDURE — 85025 COMPLETE CBC W/AUTO DIFF WBC: CPT

## 2022-11-29 PROCEDURE — OTHER COUNSELING: OTHER

## 2022-11-29 PROCEDURE — 80061 LIPID PANEL: CPT

## 2022-11-29 PROCEDURE — OTHER PRESCRIPTION: OTHER

## 2022-11-29 PROCEDURE — 36415 COLL VENOUS BLD VENIPUNCTURE: CPT

## 2022-11-29 PROCEDURE — 99204 OFFICE O/P NEW MOD 45 MIN: CPT

## 2022-11-29 RX ORDER — CLINDAMYCIN PHOSPHATE 10 MG/G
GEL TOPICAL
Qty: 60 | Refills: 3 | Status: ERX | COMMUNITY
Start: 2022-11-29

## 2022-11-29 ASSESSMENT — LOCATION SIMPLE DESCRIPTION DERM
LOCATION SIMPLE: LEFT POSTERIOR UPPER ARM
LOCATION SIMPLE: RIGHT POSTERIOR UPPER ARM

## 2022-11-29 ASSESSMENT — LOCATION ZONE DERM: LOCATION ZONE: ARM

## 2022-11-29 ASSESSMENT — LOCATION DETAILED DESCRIPTION DERM
LOCATION DETAILED: LEFT PROXIMAL POSTERIOR UPPER ARM
LOCATION DETAILED: RIGHT PROXIMAL POSTERIOR UPPER ARM
LOCATION DETAILED: RIGHT PROXIMAL LATERAL POSTERIOR UPPER ARM

## 2022-11-29 NOTE — PROCEDURE: COUNSELING
Patient Specific Counseling (Will Not Stick From Patient To Patient): Pt picking KP - gave info for Dr. Michael Rosen for Cognitive Behavioral Therapy and some names of gentle exfoliating moisturizers
Detail Level: Zone
Detail Level: Detailed
Cleanser Recommendations: Panoxyl

## 2022-12-01 ENCOUNTER — TELEMEDICINE (OUTPATIENT)
Dept: INTERNAL MEDICINE CLINIC | Facility: CLINIC | Age: 27
End: 2022-12-01
Payer: COMMERCIAL

## 2022-12-01 DIAGNOSIS — E78.1 HYPERTRIGLYCERIDEMIA: Primary | ICD-10-CM

## 2022-12-01 RX ORDER — MONTELUKAST SODIUM 10 MG/1
10 TABLET ORAL DAILY
Qty: 30 TABLET | Refills: 0 | Status: SHIPPED | OUTPATIENT
Start: 2022-12-01 | End: 2023-11-26

## 2023-01-04 ENCOUNTER — TELEPHONE (OUTPATIENT)
Dept: INTERNAL MEDICINE CLINIC | Facility: CLINIC | Age: 28
End: 2023-01-04

## 2023-01-05 NOTE — TELEPHONE ENCOUNTER
I send
No need if she is feeling better
Patient was advised of 's notes and patient verbalized understanding.
Patient would like to know if she needs to continue taking medication montelukast (SINGULAIR) 10 MG Oral Tab. If provider would like patient to continue, she would need a refill of medication. [phone]  [facility])
Pt states that she would like to continue the Montelukast because she has allergies or is loratadine better and if you think she may need Flonase nasal spray. Please advise.
previously intubated - no problems

## 2023-04-01 ENCOUNTER — HOSPITAL ENCOUNTER (OUTPATIENT)
Age: 28
Discharge: HOME | End: 2023-04-01
Payer: COMMERCIAL

## 2023-04-01 VITALS
DIASTOLIC BLOOD PRESSURE: 69 MMHG | HEART RATE: 80 BPM | HEIGHT: 66 IN | SYSTOLIC BLOOD PRESSURE: 130 MMHG | TEMPERATURE: 99 F | BODY MASS INDEX: 28.45 KG/M2 | WEIGHT: 177 LBS | RESPIRATION RATE: 22 BRPM | OXYGEN SATURATION: 100 %

## 2023-04-01 DIAGNOSIS — R19.7 DIARRHEA, UNSPECIFIED TYPE: Primary | ICD-10-CM

## 2023-04-01 LAB
#MXD IC: 0.3 X10ˆ3/UL (ref 0.1–1)
B-HCG UR QL: NEGATIVE
BILIRUB UR QL STRIP: NEGATIVE
BUN BLD-MCNC: 8 MG/DL (ref 7–18)
CHLORIDE BLD-SCNC: 102 MMOL/L (ref 98–112)
CLARITY UR: CLEAR
CO2 BLD-SCNC: 25 MMOL/L (ref 21–32)
COLOR UR: YELLOW
CREAT BLD-MCNC: 0.7 MG/DL
GFR SERPLBLD BASED ON 1.73 SQ M-ARVRAT: 123 ML/MIN/1.73M2 (ref 60–?)
GLUCOSE BLD-MCNC: 96 MG/DL (ref 70–99)
GLUCOSE UR STRIP-MCNC: NEGATIVE MG/DL
HCT VFR BLD AUTO: 37 %
HCT VFR BLD CALC: 40 %
HGB BLD-MCNC: 12.8 G/DL
HGB UR QL STRIP: NEGATIVE
ISTAT IONIZED CALCIUM FOR CHEM 8: 1.28 MMOL/L (ref 1.12–1.32)
KETONES UR STRIP-MCNC: NEGATIVE MG/DL
LYMPHOCYTES # BLD AUTO: 2 X10ˆ3/UL (ref 1–4)
LYMPHOCYTES NFR BLD AUTO: 38.8 %
MCH RBC QN AUTO: 30.8 PG (ref 26–34)
MCHC RBC AUTO-ENTMCNC: 34.6 G/DL (ref 31–37)
MCV RBC AUTO: 88.9 FL (ref 80–100)
MIXED CELL %: 6.1 %
NEUTROPHILS # BLD AUTO: 2.8 X10ˆ3/UL (ref 1.5–7.7)
NEUTROPHILS NFR BLD AUTO: 55.1 %
NITRITE UR QL STRIP: NEGATIVE
PH UR STRIP: 7 [PH]
PLATELET # BLD AUTO: 308 X10ˆ3/UL (ref 150–450)
POTASSIUM BLD-SCNC: 3.9 MMOL/L (ref 3.6–5.1)
PROT UR STRIP-MCNC: NEGATIVE MG/DL
RBC # BLD AUTO: 4.16 X10ˆ6/UL
SODIUM BLD-SCNC: 140 MMOL/L (ref 136–145)
SP GR UR STRIP: 1.02
UROBILINOGEN UR STRIP-ACNC: <2 MG/DL
WBC # BLD AUTO: 5.1 X10ˆ3/UL (ref 4–11)

## 2023-04-01 RX ORDER — POLYETHYLENE GLYCOL 3350 17 G/17G
17 POWDER, FOR SOLUTION ORAL DAILY PRN
Qty: 14 EACH | Refills: 0 | Status: SHIPPED | OUTPATIENT
Start: 2023-04-01 | End: 2023-04-15

## 2023-04-01 NOTE — ED INITIAL ASSESSMENT (HPI)
Pt here w c/o abdominal pain and c/o \"robin\" loose stools x 2 days. States before that was having constipation. No urinary symptoms. No fever.

## 2023-08-16 ENCOUNTER — MED REC SCAN ONLY (OUTPATIENT)
Dept: INTERNAL MEDICINE CLINIC | Facility: CLINIC | Age: 28
End: 2023-08-16

## 2024-02-22 NOTE — TELEPHONE ENCOUNTER
Ajovy injection from the pharmacy today.  I will put an order in for new medication (Emgality) tomorrow.  This may take a couple of weeks to fill because it needs to go through prior authorization.  The initial dose will be a loading dose, so you will inject 2 pens on the same day.  After that it will just be 1 pen every 30 days.    Try Ubrelvy 100 mg for migraine rescue instead of rizatriptan.  You can repeat the initial tablet after 2 hours if needed.    Continue Cymbalta 60 mg daily for back pain.    Follow-up with me or Dr. Pablo in 4 months, sooner if needed.   Patient would like a refill on her adderall medication. Per pt the pharmacy told her to call the office directly for it.  Pharmacy: [facility] (listed)     Current Outpatient Medications   Medication Sig Dispense Refill   • amphetamine-dextroamphetam

## 2024-03-30 NOTE — TELEPHONE ENCOUNTER
Please review. Protocol failed / No protocol.    Requested Prescriptions   Pending Prescriptions Disp Refills    MONTELUKAST 10 MG Oral Tab [Pharmacy Med Name: Montelukast Sodium 10 Mg Tab Auro] 90 tablet 0     Sig: Take 1 tablet (10 mg total) by mouth nightly.       Asthma & COPD Medication Protocol Failed - 3/28/2024  1:31 AM        Failed - Asthma Action Score greater than or equal to 20        Failed - Appointment in past 6 or next 3 months      Recent Outpatient Visits              1 year ago [facility]trig[place]idemia    [facility] [place] [facility] [place]    [facility]icine    [facility]    [facility]    [facility]    [facility]    2 [facility]    Office Visit    2 years ago Attention deficit hyperactivity disorder (ADHD), other type    [facility]    [facility]                      Failed - AAP/ACT given in last 12 months     No data recorded  No data recorded  No data recorded  No data recorded               Recent Outpatient Visits              1 year ago Hypertriglyceridemia    [facility] [place] Seamus Ley Arlinda, MD    Telemedicine    1 year ago [facility]rep [facility]    [facility]    1 year ago [facility]    [facility]    2 years ago [facility]atosis pilaris    [facility]    Office Visit    2 years ago Attention deficit hyperactivity disorder (ADHD), other type    [facility]  [facility] [place] Sandra Lion MD    Office Visit

## 2024-03-30 NOTE — TELEPHONE ENCOUNTER
I sent a my chart to advise patient to at least get a physical on board.     Please review the pended med    Last visit was telemed 12/1/22/ last in person was 9/29/22

## 2024-03-31 RX ORDER — MONTELUKAST SODIUM 10 MG/1
10 TABLET ORAL NIGHTLY
Qty: 90 TABLET | Refills: 0 | Status: SHIPPED | OUTPATIENT
Start: 2024-03-31

## 2024-08-18 NOTE — TELEPHONE ENCOUNTER
Chief complaint:   Chief Complaint   Patient presents with   • Medicare Wellness Visit   • Follow-up     Blood pressure medication          Vitals:  Visit Vitals  Vitals:    08/21/24 1314   BP: 128/80   Pulse: 71       HISTORY OF PRESENT ILLNESS     HPI  Jean Arnold is a 72 year old male with a PMH of coronary artery disease status post stent placement, type 2 diabetes, hypertension, anxiety, hyperlipidemia, elevated PSA, hypothyroidism, hearing loss, and chronic sore throat  who presented to the office today for a follow-up appointment and Medicare wellness visit. History obtained by the patient.     We discussed the problems noted below:  Coronary artery disease status post stent placement  Chest discomfort  Hyperlipidemia  On a statin, aspirin and Brilinta.  Is starting cardiac rehab and will have follow-up with cardiology.  The patient states that he has been having some chest discomfort after doing cardiac rehab sessions.  His cardiologist was informed of this and ordered a stress test.      Chronic sore throat  He continues to have a feeling of a soreness on the left side of his neck and a feeling of a sore throat.  Has been following with ENT.  His pain did not get better after stent placement.  ENT did suggest using PPIs which he declined.      Hypertension  On valsartan and amlodipine.  At his last visit he reported a little bit of lightheadedness.  The patient states that he still continues to have some episodes of lightheadedness though it did not happen yesterday or today yet.  He recorded his blood pressures at home and his average was 119/64.      Rash on the chest  4 or 5 days ago he began to have a rash on his chest.  It has been slightly worsening.  It is itchy.  It is not painful.  He does not have a rash anywhere else on his body.        Type 2 diabetes   On metformin 500 mg daily and he also uses short acting insulin as needed.  His last A1c was 6.1, improved from 6.8 prior.  He does report  Received call from [facility] that patient has scheduled follow up with provider for 05/19/2021    She is out of medication    Dr. Marito Levine, please advise if medication can be sent for patient. Med pended.     Please reply to pool: EM RN TRIAGE      Future Appointmen that recently his blood sugars have been higher and he has been using about 10 units of insulin per day.       Anxiety   PTSD/Nightmares  History of Depression   Uses clonazepam 0.5 mg daily as needed.  He does have a controlled substance agreement.  He feels that this is working well for him and would like to continue it.       Toe pain  He reports having pain in his great toes bilaterally.  This is primarily around the nails.  He has seen podiatry and had part of the nail removed without any improvement.  He describes it as a sharp pain that is constant, but seems to be made better with ambulation.      Other significant problems:  Patient Active Problem List   Diagnosis   • Anxiety   • Cardiac calcification  (CMD)   • Diabetes mellitus  (CMD)   • Essential hypertension   • High prostate specific antigen (PSA)   • Hyperlipidemia   • Hypothyroidism   • Panic attacks   • Sore throat   • PTSD (post-traumatic stress disorder)   • Controlled substance agreement signed- on 5/28/24 with Dr. Skaggs for Clonazepam. Requires q4m visits   • Coronary artery disease s/p stent placement 08/2024   • Gastroesophageal reflux disease           PAST MEDICAL, FAMILY AND SOCIAL HISTORY     Medications:  Current Outpatient Medications   Medication Sig Dispense Refill   • empagliflozin (JARDIANCE) 10 MG tablet Take 1 tablet by mouth daily (before breakfast). 30 tablet 1   • atorvastatin (LIPITOR) 80 MG tablet Take 1 tablet by mouth daily. 30 tablet 0   • amLODIPine (NORVASC) 10 MG tablet Take 1 tablet by mouth daily. 30 tablet 0   • ticagrelor (BRILINTA) 90 MG Tab Take 1 tablet by mouth every 12 hours. 60 tablet 0   • melatonin 1 MG tablet Take 1 mg by mouth daily.     • Multiple Vitamin (MULTIVITAMIN ADULT PO) Take 1 tablet by mouth daily.     • metFORMIN (GLUCOPHAGE-XR) 500 MG 24 hr tablet Take 1 tablet by mouth daily (with breakfast). 30 tablet 1   • blood glucose test strip Use to test blood sugar 3 times daily. E11.9 100 each 5   •  insulin aspart, w/niacinamide, (Fiasp FlexTouch) 100 UNIT/ML pen-injector Prime 2 units before each dose. Use 5-35 units three times per day before meals. (Patient taking differently: Inject 5-35 Units into the skin in the morning and 5-35 Units at noon and 5-35 Units in the evening. Inject with meals. Prime 2 units before each dose. Use 5-35 units three times per day before meals.) 15 mL 3   • aspirin 81 MG chewable tablet Chew 1 tablet by mouth daily. 90 tablet 1   • levothyroxine 50 MCG tablet Take 1 tablet by mouth daily. 90 tablet 1   • azelastine (ASTELIN) 0.1 % nasal spray Spray 1 spray in each nostril in the morning and 1 spray in the evening. Use in each nostril as directed 30 mL 1   • valsartan (DIOVAN) 80 MG tablet Take 1 tablet by mouth daily. 90 tablet 1   • blood glucose lancets Test blood sugar 3 times daily. Diagnosis: E11.9. Lancet: Insurance approved 100 each 1   • clonazePAM (KlonoPIN) 0.5 MG tablet Take 1 tablet by mouth daily as needed for Anxiety. 30 tablet 0   • timolol (TIMOPTIC) 0.5 % ophthalmic solution Place 1 drop into left eye daily.     • Citrulline 600 MG Cap Take 250 mg by mouth daily (with breakfast).     • NON FORMULARY Spray 1 spray in each nostril daily.       No current facility-administered medications for this visit.       Allergies:  ALLERGIES:   Allergen Reactions   • Bee Venom ANAPHYLAXIS   • Gluten Meal   (Food Or Med) Other (See Comments)     Digestive issues   • Lactose   (Food Or Med) Other (See Comments)     Congestion         Past Medical  History/Surgeries:  Past Medical History:   Diagnosis Date   • CAD (coronary artery disease)    • KAN (generalized anxiety disorder)    • GERD (gastroesophageal reflux disease)    • HLD (hyperlipidemia)    • HTN (hypertension)    • Hypothyroidism (acquired)    • Panic attacks    • PTSD (post-traumatic stress disorder)    • Sore throat, chronic    • Type 2 diabetes mellitus  (CMD)        Past Surgical History:   Procedure Laterality  Date   • Cardiac catherization     • Nasal septum surgery           Family History:  Family History   Problem Relation Age of Onset   • Diabetes Mother    • Coronary Artery Disease Mother         CABG   • Diabetes Father    • Coronary Artery Disease Father         CABG   • Thyroid Sister         Nodules   • Cancer, Breast Sister    • Diabetes Sister    • Cancer, Breast Sister          Social History:  Social History     Tobacco Use   • Smoking status: Never   • Smokeless tobacco: Never   Vaping Use   • Vaping status: never used   Substance Use Topics   • Alcohol use: Never   • Drug use: Never     Comment: former marijuana         REVIEW OF SYSTEMS     ROS Negative other than as noted in HPI    PHYSICAL EXAM     Physical Exam  Vitals and nursing note reviewed.   Constitutional:       General: He is not in acute distress.     Appearance: Normal appearance.   HENT:      Head: Normocephalic and atraumatic.      Right Ear: External ear normal.      Left Ear: External ear normal.      Nose: Nose normal.      Mouth/Throat:      Mouth: Mucous membranes are moist.      Pharynx: Oropharynx is clear. No oropharyngeal exudate.      Neck: Normal range of motion.   Eyes:      Extraocular Movements: Extraocular movements intact.      Conjunctiva/sclera: Conjunctivae normal.      Pupils: Pupils are equal, round, and reactive to light.   Cardiovascular:      Rate and Rhythm: Normal rate and regular rhythm.      Heart sounds: Normal heart sounds.   Pulmonary:      Effort: Pulmonary effort is normal. No respiratory distress.      Breath sounds: Normal breath sounds.   Musculoskeletal:         General: Normal range of motion.      Right lower leg: No edema.      Left lower leg: No edema.   Lymphadenopathy:      Cervical: No cervical adenopathy.   Skin:     General: Skin is warm.      Capillary Refill: Capillary refill takes less than 2 seconds.      Comments: Shiny skin on the bilateral lower extremities with loss of hair growth from  the mid shin down  Diabetic Foot Exam Documentation     Abnormal Bilateral Foot Exam:  Right: Skin integrity is normal - no erythema, blisters, callosities, or ulcers . Faint PT and DP pulses, but cap refill is 2-3 seconds. Pressure sensation using the [place]-Mya is mostly absent, but present on the right 5th toe and the right midfoot.    Left: Skin integrity is normal - no erythema, blisters, callosities, or ulcers . Dorsalis pedis and posterial tibial pulses are present. Pressure sensation using the [place]-Mya is present, except for the left 1st toe.          There is an erythematous papular rash on the chest.  Some of these have a central scab.  No obvious current vesicles.     Neurological:      General: No focal deficit present.      Mental Status: He is alert and oriented to person, place, and time.   Psychiatric:         Mood and Affect: Mood normal.           ASSESSMENT/PLAN     Assessment and Plan:  Medicare welcome visit  -  - New to Medicare Visit - Select if billing add'l E/M    Screening for colon cancer  - Occult Blood - iFOB (aka FIT); Future    Ideally we would do a colonoscopy for colon cancer screening however he prefers to avoid this.  He also needs to remain on aspirin and Plavix.  Will do a fecal occult blood test this year.      Pain in both feet  - US BEE Rest and Stress Bilateral; Future    He reports having pain in his bilateral toes.  On diabetic foot exam he was noted to have loss of hair growth on the bilateral lower legs and pulses are faint on the right side.  Will obtain ABIs.      Calcification of both carotid arteries  - US Vasc Carotid Duplex Bilateral; Future    He continues to have the discomfort in his neck along with intermittent lightheadedness.  Of note when he had a CT of the head done during his recent hospitalization they did note carotid artery calcifications.  Will obtain a carotid duplex.      Type 2 diabetes mellitus without complication, with long-term  current use of insulin  (CMD)  Type 2 diabetes mellitus with other circulatory complications  (CMD)  - empagliflozin (JARDIANCE) 10 MG tablet; Take 1 tablet by mouth daily (before breakfast).  Dispense: 30 tablet; Refill: 1  - Glycohemoglobin; Future  - US BEE Rest and Stress Bilateral; Future    His A1c remains well-controlled.  However he reports that recently has had some more elevated blood sugars.  We discussed different treatment options.  Discussed that we can increase metformin or we could try adding Jardiance which would also help with renal protection and cardiac protection.  He would like to try Jardiance.  Potential side effects discussed including risk of UTIs, yeast infections, and euglycemic DKA.  Discussed that if he is sick or not tolerating oral intake or having a surgery done he would need to hold Jardiance.  He is agreeable.  Will recheck his A1c in 3 months.  He is up-to-date with his urine microalbumin and his foot exam was done today.  Encouraged to stay up-to-date with eye exams.      Coronary artery disease involving native coronary artery of native heart, unspecified whether angina present    Following with cardiology.  Recently he told cardiac rehab that he has been developing chest pain after rehab sessions.  I did encourage him to complete the stress test as ordered by cardiology.      Rash  He does have a rash on his chest wall that is nonspecific.  We discussed using over-the-counter hydrocortisone cream to see if this helps it improved.  If it does not we can consider alternative treatments.  He is agreeable with this plan.    Health Maintenance:   Colonoscopy: Last done never. Due now.  PSA: Last done 8/7/2024.  Result was 3.02. Due 08/07/2025.   Vaccines: Due for COVID, shingles, PCV.  He declines.  Lung Cancer Screening:  Not indicated  Medicare Wellness: Last done today.    Documentation for this visit on 8/21/2024 was completed using a template.  I have seen and examined the  patient.  Everything documented was personally performed at this visit with the necessary additions, deletions and changes made as appropriate.    Follow-up Information  Return in about 1 month (around 9/21/2024) for follow-up diabetes .  LOS Today  OFFICE OR OTHER OUTPT VISIT EST PT 30 MINS OR MORE MOD MDM LVL 4  Patient/Caregiver verbalizes understanding of plan of care/instructions discussed today.      Indira Skaggs DO  Internal Medicine-Pediatrics  08/22/24, 12:06 PM

## 2024-09-28 ENCOUNTER — TELEPHONE (OUTPATIENT)
Dept: INTERNAL MEDICINE CLINIC | Facility: CLINIC | Age: 29
End: 2024-09-28

## 2024-10-02 RX ORDER — MONTELUKAST SODIUM 10 MG/1
10 TABLET ORAL NIGHTLY
Qty: 30 TABLET | Refills: 0 | OUTPATIENT
Start: 2024-10-02

## 2024-10-02 NOTE — TELEPHONE ENCOUNTER
Please Review. Protocol Failed; No Protocol  LAST OFFICE VISIT 12/01/2022   Mycharted patient to schedule an appointment.     Requested Prescriptions   Pending Prescriptions Disp Refills    MONTELUKAST 10 MG Oral Tab [Pharmacy Med Name: Montelukast Sodium 10 Mg Tab Auro] 90 tablet 0     Sig: TAKE 1 TABLET BY MOUTH NIGHTLY.       Asthma & COPD Medication Protocol Failed - 10/2/2024  9:06 AM        Failed - Asthma Action Score greater than or equal to 20        Failed - Appointment in past 6 or next 3 months      Recent Outpatient Visits              1 year ago Hypertriglyceridemia    [facility], [place] Seamus Ley Arlinda, MD    Telemedicine    2 years ago Strep throat    [facility] Seamus Ley Arlinda, MD    Office Visit    2 years ago [facility]    [facility]    [facility]    [facility], In[place] Zacarias PA-C    Office Visit    3 years ago Attention deficit hyperactivity disorder (ADHD), other type    [facility]    [facility] Visit                      Failed - AAP/ACT given in last 12 months     No data recorded  No data recorded  No data recorded  No data recorded                   Recent Outpatient Visits              1 year ago Hypertriglyceridemia    [facility] [place]    Telemedicine    2 years ago Strep throat    [facility] Seamus Ley Arlinda, MD    Office Visit    2 years ago Annual physical exam    [facility]    Office Visit    2 years ago [facility]atosis pilaris    [facility]    Office Visit    3 years ago  Attention deficit hyperactivity disorder (ADHD), other type    [facility], Sandra Buckner MD    Office Visit

## 2024-10-02 NOTE — TELEPHONE ENCOUNTER
Erx denied, patient due for routine appt, may refill once after they make appt for physical and follow-up with PCP.

## 2024-10-04 RX ORDER — MONTELUKAST SODIUM 10 MG/1
10 TABLET ORAL NIGHTLY
Qty: 90 TABLET | Refills: 0 | OUTPATIENT
Start: 2024-10-04

## 2024-10-07 RX ORDER — MONTELUKAST SODIUM 10 MG/1
10 TABLET ORAL NIGHTLY
Qty: 90 TABLET | Refills: 0 | OUTPATIENT
Start: 2024-10-07

## 2024-10-07 NOTE — TELEPHONE ENCOUNTER
October 3, 2024       MD    10/3/24  3:31 PM  Alisha Eisenberg routed this conversation to [facility]  Alisha Eisenberg MD    10/3/24  3:30 PM  Note     2nd attempt - left message to call back.  See message below.             MD    10/3/24  3:30 PM  Alisha Eisenberg contacted Cortney Dias Susan, RN     SS    10/3/24  9:57 AM  Note     No response letter sent in the mail.        October 2, 2024  Kelley Kimbrough     AS    10/2/24 11:12 AM  Note     Called no answer left message to call back for an appointment              AS    10/2/24 11:07 AM  Kelley Kimbrough attempted to contact Cortney Dias (Left Message)   Alisha Eisenberg MD    10/2/24  9:49 AM  Note     1st attempt - left message to call back. See message below.

## 2024-10-14 ENCOUNTER — TELEPHONE (OUTPATIENT)
Dept: INTERNAL MEDICINE CLINIC | Facility: CLINIC | Age: 29
End: 2024-10-14

## 2024-10-17 RX ORDER — MONTELUKAST SODIUM 10 MG/1
10 TABLET ORAL NIGHTLY
Qty: 90 TABLET | Refills: 0 | OUTPATIENT
Start: 2024-10-17

## 2024-10-17 NOTE — TELEPHONE ENCOUNTER
Please call patient to make an appointment and [employer] message sent,thank you.

## 2024-10-21 RX ORDER — MONTELUKAST SODIUM 10 MG/1
10 TABLET ORAL NIGHTLY
Qty: 90 TABLET | Refills: 0 | Status: SHIPPED | OUTPATIENT
Start: 2024-10-21

## 2024-10-21 NOTE — TELEPHONE ENCOUNTER
Please review; protocol failed/ has no protocol      Last Telemedicine 12/01/2022   Message sent for patient to make an appointment.     Requested Prescriptions   Pending Prescriptions Disp Refills    MONTELUKAST 10 MG Oral Tab [Pharmacy Med Name: Montelukast Sodium 10 Mg Tab Auro] 90 tablet 0     Sig: TAKE 1 TABLET BY MOUTH NIGHTLY.       Asthma & COPD Medication Protocol Failed - 10/21/2024  8:17 AM        Failed - Asthma Action Score greater than or equal to 20        Failed - Appointment in past 6 or next 3 months      Recent Outpatient Visits              1 year ago [place]triglyceridemia    [facility] [place]    Telemedicine    2 [facility]    [facility]    2 [facility]    [facility]    [facility], Inés Zacarias PA-C    Office Visit    3 years ago Attention deficit hyperactivity disorder (ADHD), other type    [facility]    [facility]                      Failed - AAP/ACT given in last 12 months     No data recorded  No data recorded  No data recorded  No data recorded             Recent Outpatient Visits              1 year ago Hypertriglyceridemia    [facility] [place]    Telemedicine    2 years ago Strep throat    [facility] [place]    Office Visit    2 years ago [facility]    [facility]    2 years ago [facility]atosis pilaris    [facility]    Office Visit    3 years ago  Attention deficit hyperactivity disorder (ADHD), other type    [facility], Sandra Buckner MD    Office Visit

## 2025-01-15 ENCOUNTER — TELEPHONE (OUTPATIENT)
Dept: INTERNAL MEDICINE CLINIC | Facility: CLINIC | Age: 30
End: 2025-01-15

## 2025-01-20 RX ORDER — MONTELUKAST SODIUM 10 MG/1
10 TABLET ORAL NIGHTLY
Qty: 90 TABLET | Refills: 0 | OUTPATIENT
Start: 2025-01-20

## 2025-01-20 NOTE — TELEPHONE ENCOUNTER
Please call patient to make an appointment.  Thank you   LAST TELEMEDICINE VISIT 12/01/2022  No future appointments.

## 2025-01-22 ENCOUNTER — OFFICE VISIT (OUTPATIENT)
Dept: INTERNAL MEDICINE CLINIC | Facility: CLINIC | Age: 30
End: 2025-01-22
Payer: COMMERCIAL

## 2025-01-22 VITALS
OXYGEN SATURATION: 98 % | HEIGHT: 65 IN | SYSTOLIC BLOOD PRESSURE: 115 MMHG | DIASTOLIC BLOOD PRESSURE: 72 MMHG | TEMPERATURE: 98 F | HEART RATE: 82 BPM | BODY MASS INDEX: 31.32 KG/M2 | WEIGHT: 188 LBS

## 2025-01-22 DIAGNOSIS — Z00.00 ANNUAL PHYSICAL EXAM: Primary | ICD-10-CM

## 2025-01-22 DIAGNOSIS — L70.0 ACNE VULGARIS: ICD-10-CM

## 2025-01-22 DIAGNOSIS — Z01.419 ENCOUNTER FOR WELL WOMAN EXAM WITH ROUTINE GYNECOLOGICAL EXAM: ICD-10-CM

## 2025-01-22 RX ORDER — FLUVOXAMINE MALEATE 100 MG
100 TABLET ORAL
COMMUNITY
Start: 2025-01-06

## 2025-01-22 RX ORDER — DEXTROAMPHETAMINE SACCHARATE, AMPHETAMINE ASPARTATE, DEXTROAMPHETAMINE SULFATE AND AMPHETAMINE SULFATE 7.5; 7.5; 7.5; 7.5 MG/1; MG/1; MG/1; MG/1
30 TABLET ORAL 2 TIMES DAILY
COMMUNITY

## 2025-01-22 RX ORDER — MONTELUKAST SODIUM 10 MG/1
10 TABLET ORAL NIGHTLY
Qty: 90 TABLET | Refills: 0 | Status: SHIPPED | OUTPATIENT
Start: 2025-01-22

## 2025-01-22 RX ORDER — LORATADINE 10 MG/1
CAPSULE, LIQUID FILLED ORAL
COMMUNITY

## 2025-01-22 NOTE — PROGRESS NOTES
HPI:   Cortney Dias is a 29 year old female who presents for a complete physical exam. She has no complains    Wt Readings from Last 3 Encounters:   01/22/25 188 lb (85.3 kg)   08/15/23 170 lb (77.1 kg)   04/01/23 177 lb (80.3 kg)     Body mass index is 31.28 kg/m².     Cholesterol, Total (mg/dL)   Date Value   11/29/2022 152     HDL Cholesterol (mg/dL)   Date Value   11/29/2022 42     LDL Cholesterol (mg/dL)   Date Value   11/29/2022 82     AST (U/L)   Date Value   11/29/2022 18     ALT (U/L)   Date Value   11/29/2022 36        Current Outpatient Medications   Medication Sig Dispense Refill    amphetamine-dextroamphetamine 30 MG Oral Tab Take 1 tablet (30 mg total) by mouth 2 (two) times daily.      Loratadine 10 MG Oral Cap Take by mouth.      fluvoxaMINE 100 MG Oral Tab Take 1 tablet (100 mg total) by mouth daily with breakfast.      montelukast 10 MG Oral Tab Take 1 tablet (10 mg total) by mouth nightly. 90 tablet 0    clindamycin 1 % External Lotion Apply 1 Application topically daily. 60 mL 0      Past Medical History:    ADHD (attention deficit hyperactivity disorder)    Anxiety state    Attention deficit hyperactivity disorder (ADHD)    Depression      Past Surgical History:   Procedure Laterality Date    [place] teeth removed        No family history on file.   Social History:   Social History     Socioeconomic History    Marital status: Single   Tobacco Use    Smoking status: Never    Smokeless tobacco: Never   Vaping Use    Vaping status: Never Used   Substance and Sexual Activity    Alcohol use: Yes     Comment: socially    Drug use: No    Sexual activity: Never     Social Drivers of Health     Food Insecurity: No Food Insecurity (1/22/2025)    NCSS - Food Insecurity     Worried About Running Out of Food in the Last Year: No     Ran Out of Food in the Last Year: No   Transportation Needs: No Transportation Needs (1/22/2025)    NCSS - Transportation     Lack of Transportation: No   Housing  Stability: Not At Risk (1/22/2025)    NCSS - Housing/Utilities     Has Housing: Yes     Worried About Losing Housing: No     Unable to Get Utilities: No     Exercise:  7 times per week.  Diet: doesn't watch     REVIEW OF SYSTEMS:     Constitutional Negative Chills, fatigue and fever.   ENMT Negative Hearing loss, nasal drainage, pain in/around ear, sinus pressure and sore throat.   Eyes Negative Eye pain and vision changes.   Respiratory Negative Cough, dyspnea and wheezing.   Cardio Negative Chest pain, claudication, edema and irregular heartbeat/palpitations.   GI Negative Abdominal pain, blood in stool, constipation, diarrhea, heartburn, nausea and vomiting.    Negative Dysuria, hematuria, urinary incontinence. Menses regular, not heavy.   Endocrine Negative Cold intolerance and heat intolerance.   Neuro Negative Dizziness, extremity weakness, headache, memory impairment, numbness in extremities, seizures and tremors.   Psych Negative Anxiety, depression and insomnia.   Integumentary Negative Breast discharge, breast lump(s), hair loss and rash.   MS Negative Back pain and joint pain.   Hema/Lymph Negative Easy bleeding, easy bruising and thromboembolic events.   Allergic/Immuno Negative Environmental allergies, food allergies and seasonal allergies.         EXAM:   /72 (BP Location: Right arm, Patient Position: Sitting, Cuff Size: large)   Pulse 82   Temp 98.1 °F (36.7 °C) (Temporal)   Ht 5' 5\" (1.651 m)   Wt 188 lb (85.3 kg)   LMP 01/13/2025 (Exact Date)   SpO2 98%   BMI 31.28 kg/m²   Body mass index is 31.28 kg/m².     Constitutional Normal Well developed.   Eyes Normal Pupil - Right: Normal, Left: Normal.   Ears Normal External - normal. Canal. TM - Normal bilaterally  Hearing - grossly normal   Nasopharynx Normal External nose - Normal. Lips/teeth/gums - Normal. Oropharynx - clear no erythema or exudate   Neck Exam Normal Palpation - Normal. No thyromegaly or lymphadenopathy   Breast Normal  Follow up with ob   Respiratory Normal Auscultation - Normal, no wheezes or rales   Cardiovascular Normal Regular rate and rhythm. No murmurs, gallops, or rubs   Vascular Normal Pulses - Dorsalis pedis: Normal. Bruits - Carotids: Absent.    Extremity Normal No edema. No varicosities.   Abdomen Normal Inspection normal, BS active. No abdominal tenderness or masses palpable   Musculoskeletal Normal Overview - Normal. No swelling or deformities.   Skin Normal Inspection - Normal.   Neurological Normal Memory - Normal. Sensory - Normal. Motor - Normal. Balance & gait - Normal.   Psychiatric Normal Orientation - Oriented to time, place, person & situation. Appropriate mood and affect.          ASSESSMENT AND PLAN:   Cortney Dias is a 29 year old female who presents for a complete physical exam.  Self breast exam explained.   Health maintenance: Patient is due for blood work   Due for pap   Pt' s weight is Body mass index is 31.28 kg/m²., recommended low fat diet and aerobic exercise 30 minutes three times weekly.  The patient indicates understanding of these issues and agrees to the plan.  The patient is asked to return for annual physical in 1 year.

## 2025-04-10 RX ORDER — MONTELUKAST SODIUM 10 MG/1
10 TABLET ORAL NIGHTLY
Qty: 90 TABLET | Refills: 3 | Status: SHIPPED | OUTPATIENT
Start: 2025-04-10

## 2025-04-14 RX ORDER — MONTELUKAST SODIUM 10 MG/1
10 TABLET ORAL NIGHTLY
Qty: 90 TABLET | Refills: 0 | OUTPATIENT
Start: 2025-04-14

## 2025-04-14 NOTE — TELEPHONE ENCOUNTER
montelukast 10 MG Oral Tab 90 tablet 3 4/10/2025 --    Sig - Route: Take 1 tablet (10 mg total) by mouth nightly. - Oral    Sent to pharmacy as: Montelukast Sodium 10 MG Oral Tablet (Singulair)    E-Prescribing Status: Receipt confirmed by pharmacy (4/10/2025 11:53 AM CDT)      Pharmacy    [id number] - [address] [address] [phone], [phone]

## 2025-04-22 RX ORDER — MONTELUKAST SODIUM 10 MG/1
10 TABLET ORAL NIGHTLY
Qty: 90 TABLET | Refills: 0 | OUTPATIENT
Start: 2025-04-22

## 2025-04-22 RX ORDER — MONTELUKAST SODIUM 10 MG/1
10 TABLET ORAL NIGHTLY
Qty: 90 TABLET | Refills: 3 | Status: SHIPPED | OUTPATIENT
Start: 2025-04-22

## 2025-04-22 NOTE — TELEPHONE ENCOUNTER
Prescription for Montelukast never received that was sent on 04/10/2025  signed as written order contacted patients [facility] verified with pharmacist they did not receive prescription for Montelukast .      montelukast 10 MG Oral Tab 90 tablet 3 4/10/2025 --    Sig - Route: Take 1 tablet (10 mg total) by mouth nightly. - Oral    Sent to pharmacy as: Montelukast Sodium 10 MG Oral Tablet (Singulair)    E-Prescribing Status: Receipt confirmed by pharmacy (4/10/2025 11:53 AM CDT)      Pharmacy    [id number] - [place], [place] - [address] [phone], [phone]

## 2025-05-19 NOTE — LETTER
August 26, 2017    Patient: Moustapha Dhillon   Date of Visit: 8/26/2017       To Whom It May Concern:    Moustapha Dhillon was seen and treated in our emergency department on 8/26/2017. She should not return to work until 08/28/17.     If you have an [FreeTextEntry1] : time spent in obtaining hx and exam along with the documentation and ordering of labs contiuity of care for this encounter

## (undated) NOTE — LETTER
[address]  [address] [facility]  Dept: [phone]  Dept Fax: [phone]  Loc: [phone]      March 13, 2017    Patient: Miguel Medina   Date of Visit: 3/13/2017       To Whom It May Concern:    Sergei

## (undated) NOTE — ED AVS SNAPSHOT
[facility] AND [facility] in [facility] [address]  [address] [facility] [place]    Phone:  [phone]    Fax:  [phone]           Silvano Deshpande   MRN: [id number]    Department:  [facility] in [address] [place] [address]   Date of Visi symptoms or problems that concern you, go directly to the closest Emergency Department.       Discharge References/Attachments     PHARYNGITIS, VIRAL (ENGLISH)    URI, VIRAL, NO ABX (ADULT) (ENGLISH)      Disclosure     Insurance plans vary and the physicia you may call the [place] [address] Physician Referral and Class Registration line at [phone] or find a doctor online by visiting [email or web address].    IF THERE IS ANY CHANGE OR WORSENING OF YOUR CONDITION, CALL YOUR PRIMARY CARE PHYSICIAN AT [place] [address] Additional Information       We are concerned for your overall well being:    - If you are a smoker or have smoked in the last 12 months, we encourage you to explore options for quitting.     - If you have concerns related to behavioral health issues or th

## (undated) NOTE — ED AVS SNAPSHOT
Erendira Arredondo   MRN: [id number]    Department:  [facility] Emergency Department   Date of Visit:  8/26/2017           Disclosure     Insurance plans vary and the physician(s) referred by the ER may not be covered by your plan.  Please conta CARE PHYSICIAN AT ONCE OR RETURN IMMEDIATELY TO THE EMERGENCY DEPARTMENT. If you have been prescribed any medication(s), please fill your prescription right away and begin taking the medication(s) as directed.   If you believe that any of the medications

## (undated) NOTE — LETTER
Date & Time: 11/13/2018, 3:18 PM  Patient: Robert Richardson  Encounter Provider(s):    Lindsay Maloney MD       To Whom It May Concern:    Robert Richardson was seen and treated in our department on 11/13/2018.  She can return to school but please al

## (undated) NOTE — LETTER
Date & Time: 1/29/2020, 9:08 PM  Patient: Isaias Funes [facility]  Encounter Provider(s):    Miguel Lambert MD       To Whom It May Concern:    Silvano Deshpande was seen and treated in our department on 1/29/2020. She cannot work for the next 1 to 2 days.

## (undated) NOTE — LETTER
September 10, 2017    [place] [id number] [facility] [id number]      Dear Lewis Jay: The following are the results of your recent tests. Please review the list of test results.   Your result is the value on the left; we have also supplied

## (undated) NOTE — LETTER
[address] [place] [address]  Dept: [phone]  Dept Fax: [phone]  Loc: [phone]         April 17, 2019    Patient: Carmelo Sue   YOB: 1995   Date of Visit: 4/17/2019

## (undated) NOTE — ED AVS SNAPSHOT
Silvano Deshpande   MRN: [id number]    Department:  [facility] Emergency Department   Date of Visit:  10/1/2018           Disclosure     Insurance plans vary and the physician(s) referred by the ER may not be covered by your plan.  Please conta CARE PHYSICIAN AT ONCE OR RETURN IMMEDIATELY TO THE EMERGENCY DEPARTMENT. If you have been prescribed any medication(s), please fill your prescription right away and begin taking the medication(s) as directed.   If you believe that any of the medications

## (undated) NOTE — LETTER
Date & Time: 10/1/2018, 5:02 AM  Patient: Erendira Arredondo  Encounter Provider(s): Gely Guerra MD       To Whom It May Concern:    Erendira Arredondo was seen and treated in our department on 10/1/2018. She can return to school/work on 10/2/18.